# Patient Record
Sex: FEMALE | Race: WHITE | NOT HISPANIC OR LATINO | Employment: PART TIME | URBAN - METROPOLITAN AREA
[De-identification: names, ages, dates, MRNs, and addresses within clinical notes are randomized per-mention and may not be internally consistent; named-entity substitution may affect disease eponyms.]

---

## 2017-03-08 ENCOUNTER — HOSPITAL ENCOUNTER (EMERGENCY)
Facility: HOSPITAL | Age: 32
Discharge: HOME | End: 2017-03-08
Attending: EMERGENCY MEDICINE
Payer: MEDICARE

## 2017-03-08 VITALS
OXYGEN SATURATION: 97 % | HEIGHT: 64 IN | HEART RATE: 79 BPM | BODY MASS INDEX: 21.34 KG/M2 | TEMPERATURE: 98 F | RESPIRATION RATE: 16 BRPM | SYSTOLIC BLOOD PRESSURE: 112 MMHG | DIASTOLIC BLOOD PRESSURE: 69 MMHG | WEIGHT: 125 LBS

## 2017-03-08 DIAGNOSIS — G43.009 NONINTRACTABLE MIGRAINE, UNSPECIFIED MIGRAINE TYPE: Primary | ICD-10-CM

## 2017-03-08 PROCEDURE — 96376 TX/PRO/DX INJ SAME DRUG ADON: CPT

## 2017-03-08 PROCEDURE — 96365 THER/PROPH/DIAG IV INF INIT: CPT

## 2017-03-08 PROCEDURE — 25000003 PHARM REV CODE 250: Performed by: EMERGENCY MEDICINE

## 2017-03-08 PROCEDURE — 96375 TX/PRO/DX INJ NEW DRUG ADDON: CPT

## 2017-03-08 PROCEDURE — 96361 HYDRATE IV INFUSION ADD-ON: CPT

## 2017-03-08 PROCEDURE — 63600175 PHARM REV CODE 636 W HCPCS: Performed by: EMERGENCY MEDICINE

## 2017-03-08 PROCEDURE — 99284 EMERGENCY DEPT VISIT MOD MDM: CPT | Mod: 25

## 2017-03-08 RX ORDER — KETOROLAC TROMETHAMINE 30 MG/ML
10 INJECTION, SOLUTION INTRAMUSCULAR; INTRAVENOUS
Status: COMPLETED | OUTPATIENT
Start: 2017-03-08 | End: 2017-03-08

## 2017-03-08 RX ORDER — DIPHENHYDRAMINE HYDROCHLORIDE 50 MG/ML
25 INJECTION INTRAMUSCULAR; INTRAVENOUS
Status: COMPLETED | OUTPATIENT
Start: 2017-03-08 | End: 2017-03-08

## 2017-03-08 RX ORDER — SUMATRIPTAN 20 MG/1
1 SPRAY NASAL
COMMUNITY
End: 2018-02-09

## 2017-03-08 RX ADMIN — PROMETHAZINE HYDROCHLORIDE 12.5 MG: 25 INJECTION, SOLUTION INTRAMUSCULAR; INTRAVENOUS at 03:03

## 2017-03-08 RX ADMIN — SODIUM CHLORIDE 1000 ML: 0.9 INJECTION, SOLUTION INTRAVENOUS at 03:03

## 2017-03-08 RX ADMIN — KETOROLAC TROMETHAMINE 10 MG: 30 INJECTION, SOLUTION INTRAMUSCULAR at 04:03

## 2017-03-08 RX ADMIN — KETOROLAC TROMETHAMINE 10 MG: 30 INJECTION, SOLUTION INTRAMUSCULAR at 03:03

## 2017-03-08 RX ADMIN — DIPHENHYDRAMINE HYDROCHLORIDE 25 MG: 50 INJECTION, SOLUTION INTRAMUSCULAR; INTRAVENOUS at 03:03

## 2017-03-08 NOTE — DISCHARGE INSTRUCTIONS
What Are Migraine and Tension Headaches?  Although there are several types of headaches, migraine and tension headaches affect the most people. When you have a headache, it isn't your brain that's hurting. Your head aches because nerves in the bones, blood vessels, meninges, and muscles of your head are irritated. These irritated nerves send pain signals to the brain, which identifies where you hurt and how bad the pain is.  Talk with your healthcare provider about a treatment plan that may help relieve pain and prevent future headaches.     What causes your headache?  The actual headache process is not yet understood. Only rarely are headaches a sign of a serious medical problem. Headache pain may be caused by abnormal interaction between the brain and the nerves and blood vessels in the head. Environmental stresses or certain foods and drinks may trigger headache pain.  What is referred pain?  Headache pain can be referred pain, which is pain that has its source in one place but is felt in another. For example, pain behind the eyes may actually be caused by tense muscles in the neck and shoulders. This means that the place that hurts may not be the part of the body that needs treatment.  Is it a migraine?  Migraine is a vascular headache that causes throbbing pain felt on one or both sides of the head. You may feel nauseated or vomit. This headache may also be preceded or associated with changes in sight (like seeing spots or flashes of light), ability to speak, or sensation (aura). There are a wide variety of environmental and food-related triggers for migraines. The pain may last for 4 to 72 hours. Afterward, you may feel shaky for a day or so. If this is the first time you experience these symptoms, you should immediately seek medical attention because you could be having a stroke.  Is it a tension headache?  This type of headache is usually a dull ache or a sensation of pressure on both sides of the head. It  "may be associated with pain or tension in the neck and shoulders. Depression, anxiety, and stress can cause a tension headache. The pain may not have a definite beginning or end. It may come and go, or seem never to go away.  When to call the healthcare provider  Call your healthcare provider for headaches that happen along with any of these symptoms:  · Sudden, severe headache that is different from your usual headache pain  · High fever along with a stiff neck  · Recurring headache in children   · Ongoing numbness or muscle weakness  · Loss of vision  · Pain following a head injury  · Convulsions, or a change in mental awareness  · A headache you would call "the worst headache you've ever had"   Date Last Reviewed: 9/14/2015 © 2000-2016 [employer]. [address] [place] [address], [place] [address]. All rights reserved. This information is not intended as a substitute for professional medical care. Always follow your healthcare professional's instructions.          Migraines and Cluster Headaches  Migraines and cluster headaches cause intense, throbbing pain on one side of the head. With a migraine, you may have nausea and vomiting and be sensitive to light and sound. You may also have warning signs, such as flashing lights or loss of parts of your vision, before the pain starts. Migraines are three times more common in women than men. This may be due to hormonal changes during menstruation. Typical migrains may last for 4 to 72 hours untreated.  Cluster headaches recur in groups for days, weeks, or months. The pain is centered around or behind one eye. The eye may also become red or teary, or the eyelid may droop. Migraines and cluster headaches can have many triggers.    Preventing migraines and cluster headaches  Try the following steps:  · Avoid aged cheeses, nuts, beans, chocolate, red wine, or foods that contain caffeine, alcohol, tobacco, nitrates, and MSG.  · Try not to skip meals.  · Dont work in " poor lighting.  · Reduce stress as much as you can.  · Get plenty of sleep each night.  · Exercise regularly under your doctors guidance.  · Avoid taking headache medicines for more than 3 days, because of the risk of rebound headaches.  Relieving the pain  Try these suggestions:  · Stay quiet and rest.  · Use cold to numb the pain. Wrap ice or a cold can of soda in a cloth. Hold it against the site of pain for 10 minutes. Repeat every 20 minutes.  · Avoid light. Wear dark glasses, turn out lights, and close the curtains. When outdoors, wear a brimmed hat.  · Drink lots of fluids. Sip caffeine-free flat soda to help relieve nausea.  · See your doctor if you get migraines or cluster headaches often. There are effective medications to help treat or prevent them.  · Hormone therapy. This may help women whose migraines are related to hormonal changes during menstruation.  Date Last Reviewed: 10/19/2015  © 7499-1823 [employer]. [address] [place] [address], [place] [address]. All rights reserved. This information is not intended as a substitute for professional medical care. Always follow your healthcare professional's instructions.

## 2017-03-08 NOTE — ED AVS SNAPSHOT
[facility] [address]  [facility] [address]               Mily Shore   3/8/2017  2:50 AM   ED    Description:  Female : 1985   Department:  [facility]           Your Care was Coordinated By:     Provider Role From To    Rob Burk MD Attending Provider 17 0251 --      Reason for Visit     Migraine           Diagnoses this Visit        Comments    Nonintractable migraine, unspecified migraine type    -  Primary       ED Disposition     None           To Do List           Follow-up Information     Follow up with Reinier Mata MD. Schedule an appointment as soon as possible for a visit in 1 week.    Specialty:  Internal Medicine    Why:  return to the ED, As needed, If symptoms worsen    Contact information:    [address] [place] [address] Dr Khalil Bryce  [place] [address]  [phone]        [facility]     [facility] -  Assistance  Registered nurses in the [facility] provide clinical advisement, health education, appointment booking, and other advisory services.  Call for this free service at [phone].             Medications           Message regarding Medications     Verify the changes and/or additions to your medication regime listed below are the same as discussed with your clinician today.  If any of these changes or additions are incorrect, please notify your healthcare provider.        These medications were administered today        Dose Freq    sodium chloride 0.9% bolus 1,000 mL 1,000 mL ED 1 Time    Sig: Inject 1,000 mLs into the vein ED 1 Time.    Class: Normal    Route: Intravenous    promethazine (PHENERGAN) 12.5 mg in dextrose 5 % 50 mL IVPB 12.5 mg ED 1 Time    Sig: Inject 12.5 mg into the vein ED 1 Time.    Class: Normal    Route: Intravenous    ketorolac injection 10 mg 10 mg ED 1 Time    Sig: Inject 10 mg into the vein ED 1 Time.    Class: Normal    Route: Intravenous  "   diphenhydrAMINE injection 25 mg 25 mg ED 1 Time    Sig: Inject 0.5 mLs (25 mg total) into the vein ED 1 Time.    Class: Normal    Route: Intravenous    ketorolac injection 10 mg 10 mg ED 1 Time    Sig: Inject 10 mg into the vein ED 1 Time.    Class: Normal    Route: Intravenous      STOP taking these medications     promethazine (PHENERGAN) 25 MG tablet Take 1 tablet (25 mg total) by mouth every 6 (six) hours as needed for Nausea.    ondansetron (ZOFRAN-ODT) 4 MG TbDL Take 1 tablet (4 mg total) by mouth every 8 (eight) hours as needed.    sumatriptan (IMITREX) 50 MG tablet Take 50 mg by mouth every 2 (two) hours as needed for Migraine.           Verify that the below list of medications is an accurate representation of the medications you are currently taking.  If none reported, the list may be blank. If incorrect, please contact your healthcare provider. Carry this list with you in case of emergency.           Current Medications     clonazepam (KLONOPIN) 2 MG Tab Take 0.5 mg by mouth every evening.     hydrocodone-acetaminophen 10-325mg (NORCO)  mg Tab Take by mouth.    sumatriptan (IMITREX) 20 mg/actuation nasal spray 1 spray by Nasal route every 2 (two) hours as needed for Migraine.    ketorolac (TORADOL) 10 mg tablet Take 15 mg by mouth every 6 (six) hours.           Clinical Reference Information           Your Vitals Were     Pulse Temp Resp Height Weight SpO2    97 97.8 °F (36.6 °C) (Oral) 16 5' 4" (1.626 m) 56.7 kg (125 lb) 100%    BMI                21.46 kg/m2          Allergies as of 3/8/2017        Reactions    Compazine [Prochlorperazine Edisylate] Other (See Comments)    "I pretty much go crazy"    Compazine [Prochlorperazine]     Reglan [Metoclopramide]     "I go crazy"    Thorazine [Chlorpromazine]     Makes her "antsy' "edgy", and itch    Penicillins Rash      Immunizations Administered on Date of Encounter - 3/8/2017     None      ED Micro, Lab, POCT     Start Ordered       Status " Ordering Provider    03/08/17 0258 03/08/17 0258    Once,   Status:  Canceled      Canceled       ED Imaging Orders     None        Discharge Instructions         What Are Migraine and Tension Headaches?  Although there are several types of headaches, migraine and tension headaches affect the most people. When you have a headache, it isn't your brain that's hurting. Your head aches because nerves in the bones, blood vessels, meninges, and muscles of your head are irritated. These irritated nerves send pain signals to the brain, which identifies where you hurt and how bad the pain is.  Talk with your healthcare provider about a treatment plan that may help relieve pain and prevent future headaches.     What causes your headache?  The actual headache process is not yet understood. Only rarely are headaches a sign of a serious medical problem. Headache pain may be caused by abnormal interaction between the brain and the nerves and blood vessels in the head. Environmental stresses or certain foods and drinks may trigger headache pain.  What is referred pain?  Headache pain can be referred pain, which is pain that has its source in one place but is felt in another. For example, pain behind the eyes may actually be caused by tense muscles in the neck and shoulders. This means that the place that hurts may not be the part of the body that needs treatment.  Is it a migraine?  Migraine is a vascular headache that causes throbbing pain felt on one or both sides of the head. You may feel nauseated or vomit. This headache may also be preceded or associated with changes in sight (like seeing spots or flashes of light), ability to speak, or sensation (aura). There are a wide variety of environmental and food-related triggers for migraines. The pain may last for 4 to 72 hours. Afterward, you may feel shaky for a day or so. If this is the first time you experience these symptoms, you should immediately seek medical attention  "because you could be having a stroke.  Is it a tension headache?  This type of headache is usually a dull ache or a sensation of pressure on both sides of the head. It may be associated with pain or tension in the neck and shoulders. Depression, anxiety, and stress can cause a tension headache. The pain may not have a definite beginning or end. It may come and go, or seem never to go away.  When to call the healthcare provider  Call your healthcare provider for headaches that happen along with any of these symptoms:  · Sudden, severe headache that is different from your usual headache pain  · High fever along with a stiff neck  · Recurring headache in children   · Ongoing numbness or muscle weakness  · Loss of vision  · Pain following a head injury  · Convulsions, or a change in mental awareness  · A headache you would call "the worst headache you've ever had"   Date Last Reviewed: 9/14/2015  © 7285-2135 [employer]. [address] [place] [address]. All rights reserved. This information is not intended as a substitute for professional medical care. Always follow your healthcare professional's instructions.          Migraines and Cluster Headaches  Migraines and cluster headaches cause intense, throbbing pain on one side of the head. With a migraine, you may have nausea and vomiting and be sensitive to light and sound. You may also have warning signs, such as flashing lights or loss of parts of your vision, before the pain starts. Migraines are three times more common in women than men. This may be due to hormonal changes during menstruation. Typical migrains may last for 4 to 72 hours untreated.  Cluster headaches recur in groups for days, weeks, or months. The pain is centered around or behind one eye. The eye may also become red or teary, or the eyelid may droop. Migraines and cluster headaches can have many triggers.    Preventing migraines and cluster headaches  Try the following " steps:  · Avoid aged cheeses, nuts, beans, chocolate, red wine, or foods that contain caffeine, alcohol, tobacco, nitrates, and MSG.  · Try not to skip meals.  · Dont work in poor lighting.  · Reduce stress as much as you can.  · Get plenty of sleep each night.  · Exercise regularly under your doctors guidance.  · Avoid taking headache medicines for more than 3 days, because of the risk of rebound headaches.  Relieving the pain  Try these suggestions:  · Stay quiet and rest.  · Use cold to numb the pain. Wrap ice or a cold can of soda in a cloth. Hold it against the site of pain for 10 minutes. Repeat every 20 minutes.  · Avoid light. Wear dark glasses, turn out lights, and close the curtains. When outdoors, wear a brimmed hat.  · Drink lots of fluids. Sip caffeine-free flat soda to help relieve nausea.  · See your doctor if you get migraines or cluster headaches often. There are effective medications to help treat or prevent them.  · Hormone therapy. This may help women whose migraines are related to hormonal changes during menstruation.  Date Last Reviewed: 10/19/2015  © 0431-4806 [employer]. [address] [place] [address], [place] [address]. All rights reserved. This information is not intended as a substitute for professional medical care. Always follow your healthcare professional's instructions.           [facility] complies with applicable Federal civil rights laws and does not discriminate on the basis of race, color, national origin, age, disability, or sex.        Language Assistance Services     ATTENTION: Language assistance services are available, free of charge. Please call [phone].      ATENCIÓN: Si habla shaggy, tiene a peralta disposición servicios gratuitos de asistencia lingüística. Llame al [phone].     CHÚ Ý: N?u b?n nói Ti?ng Vi?t, có các d?ch v? h? tr? ngôn ng? mi?n phí dành cho b?n. G?i s? [phone].

## 2017-03-08 NOTE — ED PROVIDER NOTES
"Encounter Date: 3/8/2017       History     Chief Complaint   Patient presents with    Migraine     Review of patient's allergies indicates:   Allergen Reactions    Compazine [prochlorperazine edisylate] Other (See Comments)     "I pretty much go crazy"    Compazine [prochlorperazine]     Reglan [metoclopramide]      "I go crazy"    Thorazine [chlorpromazine]      Makes her "antsy' "edgy", and itch    Penicillins Rash     HPI Comments: 31-year-old female with a past medical history of migraine headaches presents with a chief complaint of headache.  She reports that he feels like one of her typical migraines and started acutely 4 days ago.  She reports that is unrelieved with her home Imitrex.  She reports that it is associated with multiple episodes of nausea and vomiting, and photophobia.  She denies any associated fever/chills, paresthesias, changes in her voice, chest pain, or cough.  She reports her headaches are also usually controlled by Botox given by her PCP.    The history is provided by the patient.     Past Medical History:   Diagnosis Date    Abnormal Pap smear     Anxiety     Complication of anesthesia     Depression     History of psychiatric hospitalization     Insomnia     Migraine     Migraine headache     Seizure 9/10/2013    Seizures      Past Surgical History:   Procedure Laterality Date    DEEP BRAIN STIMULATOR PLACEMENT      HYSTERECTOMY      implant      to treat sz    TUBAL LIGATION       Family History   Problem Relation Age of Onset    Migraines Father     Diabetes Maternal Aunt      Social History   Substance Use Topics    Smoking status: Current Every Day Smoker     Packs/day: 0.50     Types: Cigarettes    Smokeless tobacco: None    Alcohol use Yes      Comment: occasionally     Review of Systems   Constitutional: Negative for chills and fever.   Respiratory: Negative for shortness of breath.    Cardiovascular: Negative for chest pain.   Gastrointestinal: Negative " for abdominal pain, nausea and vomiting.   Genitourinary: Negative for dysuria.   Musculoskeletal: Negative for back pain.   Skin: Negative for color change.   Neurological: Positive for headaches.   Psychiatric/Behavioral: Negative for confusion.       Physical Exam   Initial Vitals   BP Pulse Resp Temp SpO2   -- 03/08/17 0248 03/08/17 0248 03/08/17 0248 03/08/17 0248    97 16 97.8 °F (36.6 °C) 100 %     Physical Exam    Nursing note and vitals reviewed.  Constitutional: She appears well-developed and well-nourished.   HENT:   Head: Atraumatic.   Eyes: EOM are normal. Pupils are equal, round, and reactive to light.   Neck: Normal range of motion.   Cardiovascular: Normal rate and regular rhythm.   Pulmonary/Chest: Breath sounds normal.   Abdominal: Soft. Bowel sounds are normal.   Musculoskeletal: Normal range of motion.        Right shoulder: Normal.        Left shoulder: Normal.   Neurological: She is alert and oriented to person, place, and time.   Skin: Skin is warm and dry.   Psychiatric: She has a normal mood and affect.         ED Course   Procedures  Labs Reviewed   POCT URINE PREGNANCY             Medical Decision Making:   Initial Assessment:   31-year-old female presented with a chief complaint of a headache.  Differential Diagnosis:   Initial differential diagnosis included but not limited to migraine headache, tension headache, and cluster headache.  ED Management:  The patient was urgently evaluated in the ED, her evaluation was significant for a young female with a normal neurologic exam.  The patient was aggressively treated with IV Toradol, IV Phenergan, IV Benadryl, and IV fluids in the ED.  The patient's headache completely resolved after treatment in the ED.  The etiology of her headache is likely a nonintractable migraine.  She is stable for discharge home.  She is to continue her home medications as previously prescribed and she is to follow-up with her PCP for further care.                    ED Course     Clinical Impression:   The encounter diagnosis was Nonintractable migraine, unspecified migraine type.          Rob Burk MD  03/08/17 0439

## 2017-03-27 ENCOUNTER — HOSPITAL ENCOUNTER (EMERGENCY)
Facility: HOSPITAL | Age: 32
Discharge: HOME | End: 2017-03-27
Attending: EMERGENCY MEDICINE
Payer: MEDICARE

## 2017-03-27 VITALS
HEIGHT: 64 IN | HEART RATE: 85 BPM | SYSTOLIC BLOOD PRESSURE: 138 MMHG | OXYGEN SATURATION: 100 % | RESPIRATION RATE: 12 BRPM | DIASTOLIC BLOOD PRESSURE: 87 MMHG | BODY MASS INDEX: 21.34 KG/M2 | TEMPERATURE: 98 F | WEIGHT: 125 LBS

## 2017-03-27 DIAGNOSIS — S16.1XXA CERVICAL STRAIN, ACUTE, INITIAL ENCOUNTER: ICD-10-CM

## 2017-03-27 DIAGNOSIS — R51.9 NONINTRACTABLE HEADACHE, UNSPECIFIED CHRONICITY PATTERN, UNSPECIFIED HEADACHE TYPE: ICD-10-CM
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
## 2017-03-27 DIAGNOSIS — M62.838 CERVICAL PARASPINOUS MUSCLE SPASM: ICD-10-CM

## 2017-03-27 PROCEDURE — 96365 THER/PROPH/DIAG IV INF INIT: CPT

## 2017-03-27 PROCEDURE — 96372 THER/PROPH/DIAG INJ SC/IM: CPT

## 2017-03-27 PROCEDURE — 63600175 PHARM REV CODE 636 W HCPCS: Performed by: EMERGENCY MEDICINE

## 2017-03-27 PROCEDURE — 96375 TX/PRO/DX INJ NEW DRUG ADDON: CPT

## 2017-03-27 PROCEDURE — 63600175 PHARM REV CODE 636 W HCPCS: Performed by: PHYSICIAN ASSISTANT

## 2017-03-27 PROCEDURE — 96361 HYDRATE IV INFUSION ADD-ON: CPT

## 2017-03-27 PROCEDURE — 25000003 PHARM REV CODE 250: Performed by: PHYSICIAN ASSISTANT

## 2017-03-27 PROCEDURE — 96376 TX/PRO/DX INJ SAME DRUG ADON: CPT

## 2017-03-27 PROCEDURE — 99284 EMERGENCY DEPT VISIT MOD MDM: CPT | Mod: 25

## 2017-03-27 RX ORDER — CYCLOBENZAPRINE HCL 10 MG
10 TABLET ORAL 3 TIMES DAILY PRN
Qty: 12 TABLET | Refills: 0 | Status: SHIPPED | OUTPATIENT
Start: 2017-03-27 | End: 2017-03-31

## 2017-03-27 RX ORDER — LIDOCAINE 50 MG/G
1 PATCH TOPICAL DAILY
Qty: 30 PATCH | Refills: 0 | Status: SHIPPED | OUTPATIENT
Start: 2017-03-27 | End: 2017-10-11

## 2017-03-27 RX ORDER — KETOROLAC TROMETHAMINE 30 MG/ML
15 INJECTION, SOLUTION INTRAMUSCULAR; INTRAVENOUS
Status: COMPLETED | OUTPATIENT
Start: 2017-03-27 | End: 2017-03-27

## 2017-03-27 RX ORDER — PROMETHAZINE HYDROCHLORIDE 25 MG/1
25 TABLET ORAL EVERY 6 HOURS PRN
Qty: 20 TABLET | Refills: 0 | Status: SHIPPED | OUTPATIENT
Start: 2017-03-27 | End: 2017-08-29

## 2017-03-27 RX ORDER — DIPHENHYDRAMINE HYDROCHLORIDE 50 MG/ML
25 INJECTION INTRAMUSCULAR; INTRAVENOUS
Status: COMPLETED | OUTPATIENT
Start: 2017-03-27 | End: 2017-03-27

## 2017-03-27 RX ORDER — ORPHENADRINE CITRATE 30 MG/ML
30 INJECTION INTRAMUSCULAR; INTRAVENOUS
Status: COMPLETED | OUTPATIENT
Start: 2017-03-27 | End: 2017-03-27

## 2017-03-27 RX ORDER — ONDANSETRON 2 MG/ML
8 INJECTION INTRAMUSCULAR; INTRAVENOUS
Status: COMPLETED | OUTPATIENT
Start: 2017-03-27 | End: 2017-03-27

## 2017-03-27 RX ADMIN — KETOROLAC TROMETHAMINE 15 MG: 30 INJECTION, SOLUTION INTRAMUSCULAR at 02:03

## 2017-03-27 RX ADMIN — SODIUM CHLORIDE 1000 ML: 0.9 INJECTION, SOLUTION INTRAVENOUS at 02:03

## 2017-03-27 RX ADMIN — KETOROLAC TROMETHAMINE 15 MG: 30 INJECTION, SOLUTION INTRAMUSCULAR at 03:03

## 2017-03-27 RX ADMIN — ONDANSETRON 8 MG: 2 INJECTION INTRAMUSCULAR; INTRAVENOUS at 03:03

## 2017-03-27 RX ADMIN — PROMETHAZINE HYDROCHLORIDE 25 MG: 25 INJECTION INTRAMUSCULAR; INTRAVENOUS at 02:03

## 2017-03-27 RX ADMIN — DIPHENHYDRAMINE HYDROCHLORIDE 25 MG: 50 INJECTION, SOLUTION INTRAMUSCULAR; INTRAVENOUS at 02:03

## 2017-03-27 RX ADMIN — ORPHENADRINE CITRATE 30 MG: 30 INJECTION INTRAMUSCULAR; INTRAVENOUS at 02:03
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
## 2017-03-27 NOTE — ED NOTES
"Patient identifiers for Mily Shore checked and correct.  LOC: Patient is awake, alert, and aware of environment with an appropriate affect. Patient is oriented x 3 and speaking appropriately.  APPEARANCE: Patient resting comfortably and in no acute distress. Patient is clean and well groomed, patient's clothing is properly fastened.  SKIN: The skin is warm and dry. Patient has normal skin turgor and moist mucus membrances. Skin is intact; no bruising or breakdown noted.  MUSKULOSKELETAL: Patient is moving all extremities well, no obvious deformities noted. Pulses intact.   RESPIRATORY: Airway is open and patent. Respirations are spontaneous and non-labored with normal effort and rate.  CARDIAC: Patient has a normal rate and rhythm. No peripheral edema noted. Capillary refill < 3 seconds.  ABDOMEN: No distention noted. Bowel sounds active in all 4 quadrants. Soft and non-tender upon palpation.  NEUROLOGICAL: PERRL. Facial expression is symmetrical. Hand grasps are equal bilaterally. Normal sensation in all extremities when touched with finger.  Allergies reported:   Review of patient's allergies indicates:   Allergen Reactions    Compazine [prochlorperazine edisylate] Other (See Comments)     "I pretty much go crazy"    Compazine [prochlorperazine]     Reglan [metoclopramide]      "I go crazy"    Thorazine [chlorpromazine]      Makes her "antsy' "edgy", and itch    Penicillins Rash     "
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
## 2017-06-18 ENCOUNTER — HOSPITAL ENCOUNTER (EMERGENCY)
Facility: HOSPITAL | Age: 32
Discharge: HOME | End: 2017-06-18
Attending: EMERGENCY MEDICINE
Payer: MEDICARE

## 2017-06-18 VITALS
HEART RATE: 69 BPM | WEIGHT: 130 LBS | DIASTOLIC BLOOD PRESSURE: 80 MMHG | BODY MASS INDEX: 22.2 KG/M2 | SYSTOLIC BLOOD PRESSURE: 121 MMHG | TEMPERATURE: 98 F | HEIGHT: 64 IN | RESPIRATION RATE: 18 BRPM | OXYGEN SATURATION: 98 %

## 2017-06-18 DIAGNOSIS — G43.909 MIGRAINE WITHOUT STATUS MIGRAINOSUS, NOT INTRACTABLE, UNSPECIFIED MIGRAINE TYPE: Primary | ICD-10-CM

## 2017-06-18 PROCEDURE — 96375 TX/PRO/DX INJ NEW DRUG ADDON: CPT

## 2017-06-18 PROCEDURE — 25000003 PHARM REV CODE 250: Performed by: EMERGENCY MEDICINE

## 2017-06-18 PROCEDURE — 63600175 PHARM REV CODE 636 W HCPCS: Performed by: EMERGENCY MEDICINE

## 2017-06-18 PROCEDURE — 96365 THER/PROPH/DIAG IV INF INIT: CPT

## 2017-06-18 PROCEDURE — 99284 EMERGENCY DEPT VISIT MOD MDM: CPT | Mod: 25

## 2017-06-18 RX ORDER — DIPHENHYDRAMINE HYDROCHLORIDE 50 MG/ML
25 INJECTION INTRAMUSCULAR; INTRAVENOUS
Status: COMPLETED | OUTPATIENT
Start: 2017-06-18 | End: 2017-06-18

## 2017-06-18 RX ORDER — KETOROLAC TROMETHAMINE 30 MG/ML
30 INJECTION, SOLUTION INTRAMUSCULAR; INTRAVENOUS
Status: COMPLETED | OUTPATIENT
Start: 2017-06-18 | End: 2017-06-18

## 2017-06-18 RX ADMIN — KETOROLAC TROMETHAMINE 30 MG: 30 INJECTION, SOLUTION INTRAMUSCULAR at 10:06

## 2017-06-18 RX ADMIN — DIPHENHYDRAMINE HYDROCHLORIDE 25 MG: 50 INJECTION, SOLUTION INTRAMUSCULAR; INTRAVENOUS at 10:06

## 2017-06-18 RX ADMIN — PROMETHAZINE HYDROCHLORIDE 25 MG: 25 INJECTION INTRAMUSCULAR; INTRAVENOUS at 10:06

## 2017-06-18 RX ADMIN — SODIUM CHLORIDE 1000 ML: 0.9 INJECTION, SOLUTION INTRAVENOUS at 10:06

## 2017-06-19 NOTE — ED NOTES
Patient identifiers for Mily Shore checked and correct.  LOC: Eyes closed. Awakes with verbal stimuli, alert and aware of environment with an appropriate affect, the patient is oriented x 3 and speaking appropriately.  APPEARANCE: Patient uncomfortable.  SKIN: The skin is warm and dry, patient has normal skin turgor and moist mucus membranes.  MUSKULOSKELETAL: Patient moving all extremities well, no obvious swelling or deformities noted.  RESPIRATORY: Airway is open and patent, respirations are spontaneous, patient has a normal effort and rate.

## 2017-06-19 NOTE — ED PROVIDER NOTES
"Encounter Date: 6/18/2017       History     Chief Complaint   Patient presents with    Migraine     Patient presents to the ED with reports of having a migraine headache felt in both temporal areas of her head. Symptoms include nausea and multiple episodes of vomiting. Denies any fever.     Emesis     Review of patient's allergies indicates:   Allergen Reactions    Compazine [prochlorperazine edisylate] Other (See Comments)     "I pretty much go crazy"    Compazine [prochlorperazine]     Reglan [metoclopramide]      "I go crazy"    Thorazine [chlorpromazine]      Makes her "antsy' "edgy", and itch    Penicillins Rash     The history is provided by the patient.   Headache    This is a recurrent problem. The current episode started today. The problem occurs constantly. The problem has been gradually worsening. The pain is located in the bilateral region. The pain does not radiate. The pain quality is similar to prior headaches. The quality of the pain is described as excruciating. The pain is at a severity of 10/10. Associated symptoms include nausea and photophobia. Pertinent negatives include no abdominal pain, fever, neck pain or vomiting. The symptoms are aggravated by bright light. Her past medical history is significant for migraine headaches.     Past Medical History:   Diagnosis Date    Abnormal Pap smear     Anxiety     Complication of anesthesia     Depression     History of psychiatric hospitalization     Insomnia     Migraine     Migraine headache     Seizure 9/10/2013    Seizures      Past Surgical History:   Procedure Laterality Date    DEEP BRAIN STIMULATOR PLACEMENT      HYSTERECTOMY      implant      to treat sz    TUBAL LIGATION       Family History   Problem Relation Age of Onset    Migraines Father     Diabetes Maternal Aunt      Social History   Substance Use Topics    Smoking status: Current Every Day Smoker     Packs/day: 0.50     Types: Cigarettes    Smokeless tobacco: " Not on file    Alcohol use Yes      Comment: occasionally     Review of Systems   Constitutional: Negative for chills and fever.   Eyes: Positive for photophobia. Negative for visual disturbance.   Gastrointestinal: Positive for nausea. Negative for abdominal pain and vomiting.   Musculoskeletal: Negative for neck pain and neck stiffness.   Neurological: Positive for headaches. Negative for syncope.       Physical Exam     Initial Vitals [06/18/17 2153]   BP Pulse Resp Temp SpO2   134/84 85 20 97.5 °F (36.4 °C) 98 %     Physical Exam    Nursing note and vitals reviewed.  Constitutional: She appears distressed.   HENT:   Head: Normocephalic and atraumatic.   Eyes: EOM are normal. Pupils are equal, round, and reactive to light.   Neck: Normal range of motion. Neck supple.   No meningeal signs.   Cardiovascular: Normal rate, regular rhythm and normal heart sounds.   Pulmonary/Chest: Breath sounds normal.   Musculoskeletal: Normal range of motion. She exhibits no edema.   Neurological: She is alert and oriented to person, place, and time.   Skin: Skin is warm and dry.   Psychiatric: Her behavior is normal. Thought content normal.         ED Course   Procedures  Labs Reviewed - No data to display          Medical Decision Making:   ED Management:  31-year-old female with her usual type of migraine headache.  Pain was relieved here in the ED with the administration of intravenous Toradol, Phenergan and Benadryl.  She was also given 1 L of IV fluid.  She was discharged in good condition to continue her current medications and follow-up with her primary physician as needed.                   ED Course     Clinical Impression:   The encounter diagnosis was Migraine without status migrainosus, not intractable, unspecified migraine type.          Conor De La Rosa MD  06/19/17 0028

## 2017-08-24 ENCOUNTER — OFFICE VISIT (OUTPATIENT)
Dept: URGENT CARE | Facility: CLINIC | Age: 32
End: 2017-08-24
Payer: MEDICARE

## 2017-08-24 VITALS
HEART RATE: 78 BPM | SYSTOLIC BLOOD PRESSURE: 128 MMHG | TEMPERATURE: 98 F | WEIGHT: 130 LBS | OXYGEN SATURATION: 100 % | HEIGHT: 64 IN | BODY MASS INDEX: 22.2 KG/M2 | RESPIRATION RATE: 16 BRPM | DIASTOLIC BLOOD PRESSURE: 90 MMHG

## 2017-08-24 DIAGNOSIS — H81.313 VERTIGO, AURAL, BILATERAL: Primary | ICD-10-CM

## 2017-08-24 DIAGNOSIS — H66.91 ACUTE OTITIS MEDIA, RIGHT: ICD-10-CM

## 2017-08-24 DIAGNOSIS — H10.31 ACUTE CONJUNCTIVITIS OF RIGHT EYE, UNSPECIFIED ACUTE CONJUNCTIVITIS TYPE: ICD-10-CM

## 2017-08-24 DIAGNOSIS — R11.0 NAUSEA: ICD-10-CM

## 2017-08-24 PROCEDURE — 96372 THER/PROPH/DIAG INJ SC/IM: CPT | Mod: S$GLB,,, | Performed by: INTERNAL MEDICINE

## 2017-08-24 PROCEDURE — 99214 OFFICE O/P EST MOD 30 MIN: CPT | Mod: 25,S$GLB,, | Performed by: INTERNAL MEDICINE

## 2017-08-24 RX ORDER — PROMETHAZINE HYDROCHLORIDE 25 MG/1
25 TABLET ORAL EVERY 4 HOURS
Qty: 20 TABLET | Refills: 0 | Status: SHIPPED | OUTPATIENT
Start: 2017-08-24 | End: 2017-10-11

## 2017-08-24 RX ORDER — AMOXICILLIN AND CLAVULANATE POTASSIUM 875; 125 MG/1; MG/1
1 TABLET, FILM COATED ORAL EVERY 12 HOURS
Qty: 20 TABLET | Refills: 0 | Status: SHIPPED | OUTPATIENT
Start: 2017-08-24 | End: 2017-08-29 | Stop reason: ALTCHOICE

## 2017-08-24 RX ORDER — ONDANSETRON 2 MG/ML
4 INJECTION INTRAMUSCULAR; INTRAVENOUS
Status: COMPLETED | OUTPATIENT
Start: 2017-08-24 | End: 2017-08-24

## 2017-08-24 RX ORDER — BETAMETHASONE SODIUM PHOSPHATE AND BETAMETHASONE ACETATE 3; 3 MG/ML; MG/ML
9 INJECTION, SUSPENSION INTRA-ARTICULAR; INTRALESIONAL; INTRAMUSCULAR; SOFT TISSUE ONCE
Status: COMPLETED | OUTPATIENT
Start: 2017-08-24 | End: 2017-08-24

## 2017-08-24 RX ORDER — MECLIZINE HYDROCHLORIDE 25 MG/1
25 TABLET ORAL 3 TIMES DAILY PRN
Qty: 30 TABLET | Refills: 0 | Status: SHIPPED | OUTPATIENT
Start: 2017-08-24 | End: 2017-10-11

## 2017-08-24 RX ORDER — POLYMYXIN B SULFATE AND TRIMETHOPRIM 1; 10000 MG/ML; [USP'U]/ML
1 SOLUTION OPHTHALMIC EVERY 6 HOURS
Qty: 1 BOTTLE | Refills: 0 | Status: SHIPPED | OUTPATIENT
Start: 2017-08-24 | End: 2017-10-11

## 2017-08-24 RX ADMIN — ONDANSETRON 4 MG: 2 INJECTION INTRAMUSCULAR; INTRAVENOUS at 11:08

## 2017-08-24 RX ADMIN — BETAMETHASONE SODIUM PHOSPHATE AND BETAMETHASONE ACETATE 9 MG: 3; 3 INJECTION, SUSPENSION INTRA-ARTICULAR; INTRALESIONAL; INTRAMUSCULAR; SOFT TISSUE at 11:08

## 2017-08-24 NOTE — PROGRESS NOTES
"Subjective:       Patient ID: Mily Shore is a 31 y.o. female.    Vitals:  height is 5' 4" (1.626 m) and weight is 59 kg (130 lb). Her oral temperature is 98 °F (36.7 °C). Her blood pressure is 128/90 (abnormal) and her pulse is 78. Her respiration is 16 and oxygen saturation is 100%.     Chief Complaint: Otalgia    Pt states she was being treated for an ear infection since last week that has not gotten better. Pt did not take all of her antibiotics, now she has dizziness, vomiting and and headache. The dizziness started yesterday. Pt also has a migraine.       Otalgia    This is a new problem. The current episode started in the past 7 days. The problem occurs constantly. The problem has been gradually worsening. There has been no fever. The pain is at a severity of 6/10. The pain is moderate. Associated symptoms include vomiting. Pertinent negatives include no abdominal pain, diarrhea, headaches, rash or sore throat. She has tried antibiotics for the symptoms.     Review of Systems   Constitution: Negative for chills and fever.   HENT: Positive for ear pain. Negative for headaches and sore throat.    Eyes: Negative for blurred vision.   Cardiovascular: Negative for chest pain.   Respiratory: Negative for shortness of breath.    Skin: Negative for rash.   Musculoskeletal: Negative for back pain and joint pain.   Gastrointestinal: Positive for vomiting. Negative for abdominal pain, diarrhea and nausea.   Neurological: Positive for dizziness and loss of balance.   Psychiatric/Behavioral: The patient is not nervous/anxious.        Objective:      Physical Exam   Constitutional: She appears well-developed and well-nourished.   HENT:   Head: Normocephalic and atraumatic.   Right Ear: Tympanic membrane is injected and erythematous.   Left Ear: Tympanic membrane is injected and erythematous.   bilataeral cloudy fluid behind TMs   Eyes: EOM are normal. Pupils are equal, round, and reactive to light.   Conjunctiva " injected on R       Assessment:       1. Vertigo, aural, bilateral    2. Acute otitis media, right    3. Acute conjunctivitis of right eye, unspecified acute conjunctivitis type    4. Nausea       Plan:

## 2017-08-29 ENCOUNTER — HOSPITAL ENCOUNTER (EMERGENCY)
Facility: HOSPITAL | Age: 32
Discharge: HOME | End: 2017-08-29
Attending: EMERGENCY MEDICINE | Admitting: EMERGENCY MEDICINE
Payer: MEDICARE

## 2017-08-29 VITALS
HEIGHT: 64 IN | SYSTOLIC BLOOD PRESSURE: 112 MMHG | DIASTOLIC BLOOD PRESSURE: 85 MMHG | WEIGHT: 125 LBS | TEMPERATURE: 99 F | HEART RATE: 103 BPM | RESPIRATION RATE: 18 BRPM | OXYGEN SATURATION: 100 % | BODY MASS INDEX: 21.34 KG/M2

## 2017-08-29 DIAGNOSIS — H65.03 BILATERAL ACUTE SEROUS OTITIS MEDIA, RECURRENCE NOT SPECIFIED: ICD-10-CM

## 2017-08-29 DIAGNOSIS — R51.9 ACUTE NONINTRACTABLE HEADACHE, UNSPECIFIED HEADACHE TYPE: Primary | ICD-10-CM

## 2017-08-29 PROCEDURE — 63600175 PHARM REV CODE 636 W HCPCS: Performed by: PHYSICIAN ASSISTANT

## 2017-08-29 PROCEDURE — 96365 THER/PROPH/DIAG IV INF INIT: CPT

## 2017-08-29 PROCEDURE — 25000003 PHARM REV CODE 250: Performed by: PHYSICIAN ASSISTANT

## 2017-08-29 PROCEDURE — 99284 EMERGENCY DEPT VISIT MOD MDM: CPT | Mod: 25

## 2017-08-29 PROCEDURE — 96375 TX/PRO/DX INJ NEW DRUG ADDON: CPT

## 2017-08-29 RX ORDER — GUAIFENESIN/DEXTROMETHORPHAN 100-10MG/5
5 SYRUP ORAL 4 TIMES DAILY PRN
Qty: 120 ML | Refills: 0 | Status: SHIPPED | OUTPATIENT
Start: 2017-08-29 | End: 2017-09-08

## 2017-08-29 RX ORDER — FLUTICASONE PROPIONATE 50 MCG
1 SPRAY, SUSPENSION (ML) NASAL 2 TIMES DAILY
Qty: 15 G | Refills: 0 | Status: SHIPPED | OUTPATIENT
Start: 2017-08-29 | End: 2017-09-05

## 2017-08-29 RX ORDER — DIPHENHYDRAMINE HYDROCHLORIDE 50 MG/ML
25 INJECTION INTRAMUSCULAR; INTRAVENOUS
Status: COMPLETED | OUTPATIENT
Start: 2017-08-29 | End: 2017-08-29

## 2017-08-29 RX ORDER — PHENYLEPHRINE HCL 10 MG/1
10 TABLET, FILM COATED ORAL EVERY 4 HOURS PRN
COMMUNITY
End: 2017-10-11

## 2017-08-29 RX ORDER — KETOROLAC TROMETHAMINE 30 MG/ML
30 INJECTION, SOLUTION INTRAMUSCULAR; INTRAVENOUS
Status: COMPLETED | OUTPATIENT
Start: 2017-08-29 | End: 2017-08-29

## 2017-08-29 RX ORDER — CEFDINIR 300 MG/1
300 CAPSULE ORAL 2 TIMES DAILY
Qty: 20 CAPSULE | Refills: 0 | Status: SHIPPED | OUTPATIENT
Start: 2017-08-29 | End: 2017-09-08

## 2017-08-29 RX ADMIN — KETOROLAC TROMETHAMINE 30 MG: 30 INJECTION, SOLUTION INTRAMUSCULAR at 11:08

## 2017-08-29 RX ADMIN — SODIUM CHLORIDE 1000 ML: 0.9 INJECTION, SOLUTION INTRAVENOUS at 11:08

## 2017-08-29 RX ADMIN — DIPHENHYDRAMINE HYDROCHLORIDE 25 MG: 50 INJECTION, SOLUTION INTRAMUSCULAR; INTRAVENOUS at 11:08

## 2017-08-29 RX ADMIN — PROMETHAZINE HYDROCHLORIDE 25 MG: 25 INJECTION INTRAMUSCULAR; INTRAVENOUS at 11:08

## 2017-08-29 NOTE — ED NOTES
States that headache and nausea are better but her right ear continues to be very painful asking for warm compress, given to pt call light in reach

## 2017-08-29 NOTE — ED PROVIDER NOTES
"Encounter Date: 8/29/2017       History     Chief Complaint   Patient presents with    Headache     earache, "I feel dizzy and off", no relief with meds.     Patient is a 31 year old female who presents with headache for 5 days. She reports PMH significant for anxiety, seizures and migraine headaches. She states she has chronic migraines for which she takes imitrex. She reports she has been unable to take the oral Imitrex secondary to nausea and vomiting. She has been trying to use the nasal spray with no significant improvement. She reports pain to bilateral temporal areas. She reports associated dizziness and photophobia. She states the symptoms are consistent with her chronic migraines. She denied sudden onset of symptoms and denied fever, neck pain or trauma.       The history is provided by the patient.     Review of patient's allergies indicates:   Allergen Reactions    Compazine [prochlorperazine edisylate] Other (See Comments)     "I pretty much go crazy"    Compazine [prochlorperazine]     Reglan [metoclopramide]      "I go crazy"    Thorazine [chlorpromazine]      Makes her "antsy' "edgy", and itch    Penicillins Rash     Past Medical History:   Diagnosis Date    Abnormal Pap smear     Anxiety     Complication of anesthesia     Depression     History of psychiatric hospitalization     Insomnia     Migraine     Migraine headache     Seizure 9/10/2013    Seizures      Past Surgical History:   Procedure Laterality Date    DEEP BRAIN STIMULATOR PLACEMENT      HYSTERECTOMY      implant      to treat sz    TUBAL LIGATION       Family History   Problem Relation Age of Onset    Migraines Father     Diabetes Maternal Aunt      Social History   Substance Use Topics    Smoking status: Current Every Day Smoker     Packs/day: 0.50     Types: Cigarettes    Smokeless tobacco: Never Used    Alcohol use Yes      Comment: occasionally     Review of Systems   Constitutional: Negative for chills and " fever.   HENT: Negative for congestion and sore throat.    Eyes: Positive for photophobia and visual disturbance.   Respiratory: Negative for cough and shortness of breath.    Cardiovascular: Negative for chest pain.   Gastrointestinal: Positive for nausea and vomiting. Negative for abdominal pain and diarrhea.   Genitourinary: Negative for dysuria.   Musculoskeletal: Negative for back pain.   Skin: Negative for rash.   Neurological: Positive for dizziness and headaches. Negative for seizures, syncope and weakness.   Hematological: Does not bruise/bleed easily.       Physical Exam     Initial Vitals [08/29/17 1051]   BP Pulse Resp Temp SpO2   112/85 103 18 98.5 °F (36.9 °C) 100 %      MAP       94         Physical Exam    Nursing note and vitals reviewed.  Constitutional: She appears well-developed and well-nourished. No distress.   HENT:   Head: Normocephalic and atraumatic.   Right Ear: External ear and ear canal normal.   Left Ear: External ear and ear canal normal.   Nose: Nose normal.   Fluid noted behind bilateral TM's with no erythema or retraction.    Eyes: Conjunctivae are normal. Pupils are equal, round, and reactive to light. Right eye exhibits no discharge. Left eye exhibits no discharge.   Neck: Normal range of motion and full passive range of motion without pain. Neck supple.   Cardiovascular: Normal rate, regular rhythm and normal heart sounds. Exam reveals no gallop and no friction rub.    No murmur heard.  Pulmonary/Chest: Breath sounds normal. She has no wheezes. She has no rhonchi. She has no rales.   Abdominal: Soft. Bowel sounds are normal. There is no tenderness. There is no guarding.   Musculoskeletal: Normal range of motion.   Neurological: She is alert and oriented to person, place, and time. She has normal strength. No sensory deficit. Coordination and gait normal.   Cranial nerves II-XII intact   Skin: Skin is warm and dry.         ED Course   Procedures  Labs Reviewed - No data to  display          Medical Decision Making:   History:   I obtained history from: someone other than patient.  Old Medical Records: I decided to obtain old medical records.       APC / Resident Notes:   This is an emergent evaluation of a 31 year old female who presents with acute on chronic headache and bilateral ear pain. She is well appearing. She is ambulating in ED without difficulty. Her neuro exam is normal. She denied fever or nuchal rigidity, I doubt meningitis. She denied sudden onset of symptoms or clap headache. Her symptoms are consistent with her previous headaches and there is low suspicion for acute intra-cranial process. She was given benadryl, Toradol and phenergan with resolution of her symptoms. Discussed results with patient. Return precautions given. Patient is to follow up with their primary care provider. Case was discussed with Dr. Cabello who is in agreement with the plan of care. All questions answered.                 ED Course     Clinical Impression:   The primary encounter diagnosis was Acute nonintractable headache, unspecified headache type. A diagnosis of Bilateral acute serous otitis media, recurrence not specified was also pertinent to this visit.                           Iva Fraser PA-C  08/29/17 1760

## 2017-08-29 NOTE — DISCHARGE INSTRUCTIONS
Use the nasal spray and take the decongestant to help with the fluid behind your ears.  If symptoms don't improve in the next 48 hours then you can start the antibiotics.   See ENT if symptoms don't improve.

## 2017-09-21 ENCOUNTER — TELEPHONE (OUTPATIENT)
Dept: NEUROLOGY | Facility: CLINIC | Age: 32
End: 2017-09-21

## 2017-09-21 NOTE — TELEPHONE ENCOUNTER
----- Message from Jennie Morin sent at 9/21/2017  3:15 PM CDT -----  Contact: [phone]  Schedule a new patient appointment for migraines as soon as possible.  Call [phone].

## 2017-09-28 ENCOUNTER — PATIENT MESSAGE (OUTPATIENT)
Dept: NEUROLOGY | Facility: CLINIC | Age: 32
End: 2017-09-28

## 2017-10-03 ENCOUNTER — PATIENT MESSAGE (OUTPATIENT)
Dept: NEUROLOGY | Facility: CLINIC | Age: 32
End: 2017-10-03

## 2017-10-09 ENCOUNTER — PATIENT MESSAGE (OUTPATIENT)
Dept: NEUROLOGY | Facility: CLINIC | Age: 32
End: 2017-10-09

## 2017-10-11 ENCOUNTER — OFFICE VISIT (OUTPATIENT)
Dept: NEUROLOGY | Facility: CLINIC | Age: 32
End: 2017-10-11
Payer: MEDICARE

## 2017-10-11 VITALS — HEART RATE: 75 BPM | DIASTOLIC BLOOD PRESSURE: 82 MMHG | SYSTOLIC BLOOD PRESSURE: 117 MMHG | HEIGHT: 64 IN

## 2017-10-11 DIAGNOSIS — R51.9 CHRONIC DAILY HEADACHE: ICD-10-CM

## 2017-10-11 DIAGNOSIS — G47.9 TROUBLE IN SLEEPING: ICD-10-CM

## 2017-10-11 DIAGNOSIS — E55.9 VITAMIN D DEFICIENCY: ICD-10-CM

## 2017-10-11 DIAGNOSIS — G43.719 INTRACTABLE CHRONIC MIGRAINE WITHOUT AURA AND WITHOUT STATUS MIGRAINOSUS: Primary | ICD-10-CM

## 2017-10-11 DIAGNOSIS — R53.83 FATIGUE, UNSPECIFIED TYPE: ICD-10-CM

## 2017-10-11 DIAGNOSIS — R51.9 WORSENING HEADACHES: ICD-10-CM

## 2017-10-11 DIAGNOSIS — F32.A DEPRESSION, UNSPECIFIED DEPRESSION TYPE: ICD-10-CM

## 2017-10-11 PROBLEM — G43.709 CHRONIC MIGRAINE WITHOUT AURA: Status: ACTIVE | Noted: 2017-10-11

## 2017-10-11 PROCEDURE — 99204 OFFICE O/P NEW MOD 45 MIN: CPT | Mod: S$PBB,,, | Performed by: NURSE PRACTITIONER

## 2017-10-11 PROCEDURE — 99999 PR PBB SHADOW E&M-EST. PATIENT-LVL III: CPT | Mod: PBBFAC,,, | Performed by: NURSE PRACTITIONER

## 2017-10-11 PROCEDURE — 99213 OFFICE O/P EST LOW 20 MIN: CPT | Mod: PBBFAC | Performed by: NURSE PRACTITIONER

## 2017-10-11 RX ORDER — PREDNISONE 20 MG/1
TABLET ORAL
Qty: 30 TABLET | Refills: 0 | Status: SHIPPED | OUTPATIENT
Start: 2017-10-11 | End: 2017-11-08 | Stop reason: ALTCHOICE

## 2017-10-11 RX ORDER — SUMATRIPTAN SUCCINATE 4 MG/.5ML
INJECTION, SOLUTION SUBCUTANEOUS
Qty: 12 EACH | Refills: 5 | Status: SHIPPED | OUTPATIENT
Start: 2017-10-11 | End: 2018-04-26 | Stop reason: SDUPTHER

## 2017-10-11 RX ORDER — AMITRIPTYLINE HYDROCHLORIDE 25 MG/1
TABLET, FILM COATED ORAL
Qty: 60 TABLET | Refills: 1 | Status: SHIPPED | OUTPATIENT
Start: 2017-10-11 | End: 2017-11-08 | Stop reason: SINTOL
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
## 2017-10-12 ENCOUNTER — PATIENT MESSAGE (OUTPATIENT)
Dept: NEUROLOGY | Facility: CLINIC | Age: 32
End: 2017-10-12

## 2017-10-18 ENCOUNTER — HOSPITAL ENCOUNTER (EMERGENCY)
Facility: HOSPITAL | Age: 32
Discharge: HOME | End: 2017-10-18
Attending: EMERGENCY MEDICINE
Payer: MEDICARE

## 2017-10-18 VITALS
RESPIRATION RATE: 18 BRPM | HEIGHT: 64 IN | TEMPERATURE: 97 F | HEART RATE: 79 BPM | WEIGHT: 130 LBS | BODY MASS INDEX: 22.2 KG/M2 | DIASTOLIC BLOOD PRESSURE: 79 MMHG | OXYGEN SATURATION: 100 % | SYSTOLIC BLOOD PRESSURE: 111 MMHG

## 2017-10-18 DIAGNOSIS — G43.909 MIGRAINE WITHOUT STATUS MIGRAINOSUS, NOT INTRACTABLE, UNSPECIFIED MIGRAINE TYPE: Primary | ICD-10-CM

## 2017-10-18 DIAGNOSIS — G43.709 CHRONIC MIGRAINE WITHOUT AURA WITHOUT STATUS MIGRAINOSUS, NOT INTRACTABLE: Primary | ICD-10-CM

## 2017-10-18 PROCEDURE — 99284 EMERGENCY DEPT VISIT MOD MDM: CPT | Mod: 25

## 2017-10-18 PROCEDURE — 96375 TX/PRO/DX INJ NEW DRUG ADDON: CPT

## 2017-10-18 PROCEDURE — 96365 THER/PROPH/DIAG IV INF INIT: CPT

## 2017-10-18 PROCEDURE — 25000003 PHARM REV CODE 250: Performed by: EMERGENCY MEDICINE

## 2017-10-18 PROCEDURE — 63600175 PHARM REV CODE 636 W HCPCS: Performed by: EMERGENCY MEDICINE

## 2017-10-18 RX ORDER — DIPHENHYDRAMINE HYDROCHLORIDE 50 MG/ML
25 INJECTION INTRAMUSCULAR; INTRAVENOUS
Status: COMPLETED | OUTPATIENT
Start: 2017-10-18 | End: 2017-10-18

## 2017-10-18 RX ORDER — KETOROLAC TROMETHAMINE 30 MG/ML
30 INJECTION, SOLUTION INTRAMUSCULAR; INTRAVENOUS
Status: COMPLETED | OUTPATIENT
Start: 2017-10-18 | End: 2017-10-18

## 2017-10-18 RX ADMIN — KETOROLAC TROMETHAMINE 30 MG: 30 INJECTION, SOLUTION INTRAMUSCULAR at 06:10

## 2017-10-18 RX ADMIN — PROMETHAZINE HYDROCHLORIDE 25 MG: 25 INJECTION INTRAMUSCULAR; INTRAVENOUS at 06:10

## 2017-10-18 RX ADMIN — SODIUM CHLORIDE 1000 ML: 0.9 INJECTION, SOLUTION INTRAVENOUS at 06:10

## 2017-10-18 RX ADMIN — DIPHENHYDRAMINE HYDROCHLORIDE 25 MG: 50 INJECTION, SOLUTION INTRAMUSCULAR; INTRAVENOUS at 06:10

## 2017-10-18 NOTE — DISCHARGE INSTRUCTIONS
Take your medications as prescribed.  Follow up with your primary care physician.  Follow-up with neurology for further management of your migraines.  Return to the emergency department if you've increased pain, fever that does not respond to medications, rash or any other concerns.  Please refer to the additional material providing further information including when return to the emergency department.

## 2017-10-18 NOTE — PROVIDER PROGRESS NOTES - EMERGENCY DEPT.
Encounter Date: 10/18/2017    ED Physician Progress Notes           This is an assumption of care note    Case accepted from Dr. Acosta at 0615, pending response to treatment  I agree with previous physician's history/physical and overall assessment.   This is a 31-year-old female with a history of migraines who presents to the emergency department with her usual presentation.  She did take her home medication of Imitrex at home but did not help with her headache.        PHYSICAL EXAMINATION:  GENERAL:   Alert and oriented x3, no acute distress  VITAL SIGNS:  Per nurse's note, reviewed by me .  SKIN:  Warm, dry; no cyanosis; no rash, no pallor  HEAD:  Atraumatic, normocephalic, no scalp swelling, no tenderness.  EYES:  no conjunctival pallor, no scleral icterus, EOMI.  ENMT:  Pharynx:  no erythema; airway patent: no stridor; mucous membranes moist  NECK:  no tenderness, normal ROM, no lymphadenopathy.  CHEST AND RESPIRATORY:  No distress, no rales, no rhonchi, no wheezes.  HEART AND CARDIOVASCULAR:  Normal rate, no irregularity; no murmur,   ABDOMEN AND GI:  Soft; no tenderness, no guarding, no rebound, no masses  EXTREMITIES:  no deformity, no edema, no tenderness.  NEURO AND PSYCH:  Mental status as above.  Cranial nerves grossly intact; strength symmetric, sensation grossly intact bilaterally. Gait normal.  ,    0745 Patient improved with treatment in the emergency department and comfortable going home. Discussed reasons to return and importance of followup.  Patient understands that the emergency visit today is primarily to address immediate concerns and to rule out emergent cause of symptoms and that they may require further workup and evaluation as an outpatient. All questions addressed and patient given discharge instructions and followup information.     Disposition: Discharged  Diagnosis: Migraines, non-intractable

## 2017-10-18 NOTE — ED NOTES
Pt. presents with chronic migraine located to R temporal area. HA is constant. Described as throbbing. Radiates to R lateral neck. No obvious stiffness, fever or chill. Denies vision changes. Denies weakness or fatigue. Reports nausea and photophobia. Denies dizziness or room spinning. States this is typical of her migraines. Has not had migraine Botox treatments in over 3 months-this is abnormal for pt.-per pt. Unable to assess gait and ambulation at present time. Pt. laying on stretcher with eyes closed, hands over face, refusing to make eye contact, lights off and moaning. Side rails up. Call bell in reach.

## 2017-10-18 NOTE — ED PROVIDER NOTES
"Encounter Date: 10/18/2017    SCRIBE #1 NOTE: I, Izabel Figueroa, am scribing for, and in the presence of, Dr. Acosta.       History     Chief Complaint   Patient presents with    Migraine     pt to triage ambulatory and reports is having a migraine headache and beganm at 12 noon and pt takes imitrex but only small amount of relief; pt also reports vomiting; no vomiting at present; right temporal, neck and right posterior headache and throbbing and constant     This is a 31 y.o. female who has a past medical history of Abnormal Pap smear; Anxiety; Complication of anesthesia; Depression;   History of psychiatric hospitalization; Insomnia; Migraine; Migraine headache; Seizure (9/10/2013); and Seizures.   The patient presents to the Emergency Department with migraine.  The patient has frequent, chronic migraines.   She states there was a change in her neurologist and she can no longer get Botox for her migraines.   She is 3 months past her normal timing of Botox treatments. The patient is in process of reestablishing this treatment with another neurologist.   The patient states this headache is very typical of her migraines but did not improve after Imitrex.     Pt has a past surgical history that includes Tubal ligation; Deep brain stimulator placement; Hysterectomy; and implant.           Review of patient's allergies indicates:   Allergen Reactions    Compazine [prochlorperazine edisylate] Other (See Comments)     "I pretty much go crazy"    Compazine [prochlorperazine]     Reglan [metoclopramide]      "I go crazy"    Thorazine [chlorpromazine]      Makes her "antsy' "edgy", and itch    Penicillins Rash     Past Medical History:   Diagnosis Date    Abnormal Pap smear     Anxiety     Complication of anesthesia     Depression     History of psychiatric hospitalization     Insomnia     Migraine     Migraine headache     Seizure 9/10/2013    Seizures      Past Surgical History:   Procedure Laterality Date "    DEEP BRAIN STIMULATOR PLACEMENT      HYSTERECTOMY      implant      to treat sz    TUBAL LIGATION       Family History   Problem Relation Age of Onset    Migraines Father     Diabetes Maternal Aunt     Hyperlipidemia Mother     Migraines Paternal Aunt     Migraines Paternal Grandmother      Social History   Substance Use Topics    Smoking status: Current Every Day Smoker     Packs/day: 0.50     Types: Cigarettes    Smokeless tobacco: Never Used    Alcohol use Yes      Comment: occasionally     Review of Systems   Constitutional: Negative for fever.   HENT: Negative for facial swelling.    Eyes: Positive for photophobia.   Respiratory: Negative for shortness of breath.    Cardiovascular: Negative for chest pain.   Gastrointestinal: Negative for abdominal pain.   Genitourinary: Negative for difficulty urinating.   Musculoskeletal: Negative for gait problem.   Skin: Negative for rash.   Neurological: Positive for headaches.       Physical Exam     Initial Vitals [10/18/17 0547]   BP Pulse Resp Temp SpO2   (!) 129/93 78 18 97.9 °F (36.6 °C) 100 %      MAP       105         Physical Exam    Nursing note and vitals reviewed.  Constitutional: She appears well-developed and well-nourished. No distress.   HENT:   Head: Normocephalic and atraumatic.   Mouth/Throat: Oropharynx is clear and moist.   Eyes: Conjunctivae are normal. Pupils are equal, round, and reactive to light.   Neck: Normal range of motion. Neck supple.   Cardiovascular: Normal rate, regular rhythm and normal heart sounds.   Pulmonary/Chest: Breath sounds normal. No respiratory distress. She has no wheezes. She has no rhonchi. She has no rales.   Abdominal: Soft. Bowel sounds are normal. She exhibits no distension. There is no tenderness.   Musculoskeletal: Normal range of motion. She exhibits no edema or tenderness.   Lymphadenopathy:     She has no cervical adenopathy.   Neurological: She is alert and oriented to person, place, and time.    Skin: Skin is warm and dry. Capillary refill takes less than 2 seconds. No rash noted. No erythema.   Psychiatric: She has a normal mood and affect. Thought content normal.         ED Course   Procedures  Labs Reviewed - No data to display          Medical Decision Making:   History:   Old Medical Records: I decided to obtain old medical records.  Initial Assessment:   This is a 31 y.o. female who presents with headache. I believe the patient has a migraine headache based upon the history and physical exam and pt course in the ED.  The patient's headaches are similar to their prior headaches for which they have been diagnosed as migraines in the past.  The patient has had a prior negative CT Head.  They have no focal weakness, numbness, meningismus, other focal neurologic deficit, history of trauma, fevers, elevated blood pressure to suggest meningitis, subarachnoid hemorrhage, TIA, stroke, mass, or hypertensive urgency.  I will treat the patient supportively. Results, clinical impression and rx discussed with patient. Pt to call for follow up with PCP or referred physician in 2-3 days. Pt is to return to the ED immediately for any new or worsening symptoms, including but not limited to: fever, chills, worsening pain, nausea/vomiting, weakness/fatigue, or for any other concerns.  Pt had time for ask questions to which they were addressed. Pt expressed understanding.                  Attending Attestation:           Physician Attestation for Scribe:      Comments: I, Dr. Anton Acosta, personally performed the services described in this documentation.   All medical record entries made by the scribe were at my direction and in my presence.   I have reviewed the chart and agree that the record is accurate and complete.   Anton Acosta MD.  5:20 AM 10/19/2017       Attending ED Notes:   Pt was re-evaluated and turned over to Dr. Shine at 0600 in stable condition. Pt is pending treatment and re-evaluation and disposition.            ED Course      Clinical Impression:   The encounter diagnosis was Migraine without status migrainosus, not intractable, unspecified migraine type.                           Anton Acosta MD  10/19/17 2985

## 2017-11-01 ENCOUNTER — TELEPHONE (OUTPATIENT)
Dept: NEUROLOGY | Facility: CLINIC | Age: 32
End: 2017-11-01

## 2017-11-01 ENCOUNTER — PATIENT MESSAGE (OUTPATIENT)
Dept: NEUROLOGY | Facility: CLINIC | Age: 32
End: 2017-11-01

## 2017-11-01 NOTE — TELEPHONE ENCOUNTER
Called and spoke to Patient.Instructions given per Patricia Wing.Patient to come in next week to be seen.She stated her lips were blue this am but then cleared.She relates this to the dosage of 2 tablets.Taper schedule reviewed with her and she is aware that is any effects that she deems severe to notify clinic.

## 2017-11-08 ENCOUNTER — PROCEDURE VISIT (OUTPATIENT)
Dept: NEUROLOGY | Facility: CLINIC | Age: 32
End: 2017-11-08
Payer: MEDICARE

## 2017-11-08 VITALS
DIASTOLIC BLOOD PRESSURE: 83 MMHG | HEIGHT: 64 IN | HEART RATE: 84 BPM | SYSTOLIC BLOOD PRESSURE: 119 MMHG | BODY MASS INDEX: 24.42 KG/M2 | WEIGHT: 143.06 LBS

## 2017-11-08 DIAGNOSIS — G43.709 CHRONIC MIGRAINE WITHOUT AURA WITHOUT STATUS MIGRAINOSUS, NOT INTRACTABLE: Primary | ICD-10-CM

## 2017-11-08 DIAGNOSIS — G47.00 INSOMNIA, UNSPECIFIED TYPE: ICD-10-CM

## 2017-11-08 PROCEDURE — 64615 CHEMODENERV MUSC MIGRAINE: CPT | Mod: PBBFAC | Performed by: NURSE PRACTITIONER

## 2017-11-08 PROCEDURE — 64615 CHEMODENERV MUSC MIGRAINE: CPT | Mod: S$PBB,,, | Performed by: NURSE PRACTITIONER

## 2017-11-08 RX ORDER — SUMATRIPTAN SUCCINATE 4 MG/.5ML
INJECTION, SOLUTION SUBCUTANEOUS
Refills: 5 | COMMUNITY
Start: 2017-10-12 | End: 2017-11-08 | Stop reason: SDUPTHER

## 2017-11-08 RX ORDER — KETOROLAC TROMETHAMINE 30 MG/ML
30 INJECTION, SOLUTION INTRAMUSCULAR; INTRAVENOUS
Qty: 6 SYRINGE | Refills: 3 | Status: SHIPPED | OUTPATIENT
Start: 2017-11-08 | End: 2018-02-09

## 2017-11-08 RX ORDER — METHYLPREDNISOLONE 4 MG/1
TABLET ORAL
Refills: 0 | COMMUNITY
Start: 2017-08-16 | End: 2017-11-08 | Stop reason: ALTCHOICE

## 2017-11-08 RX ORDER — CLONAZEPAM 0.5 MG/1
TABLET ORAL
COMMUNITY
Start: 2017-09-27 | End: 2017-11-08

## 2017-11-08 RX ADMIN — ONABOTULINUMTOXINA 200 UNITS: 100 INJECTION, POWDER, LYOPHILIZED, FOR SOLUTION INTRADERMAL; INTRAMUSCULAR at 10:11
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
## 2017-11-10 ENCOUNTER — TELEPHONE (OUTPATIENT)
Dept: NEUROLOGY | Facility: CLINIC | Age: 32
End: 2017-11-10

## 2017-11-13 ENCOUNTER — TELEPHONE (OUTPATIENT)
Dept: NEUROLOGY | Facility: CLINIC | Age: 32
End: 2017-11-13

## 2018-01-26 DIAGNOSIS — G43.709 CHRONIC MIGRAINE WITHOUT AURA WITHOUT STATUS MIGRAINOSUS, NOT INTRACTABLE: Primary | ICD-10-CM

## 2018-02-09 ENCOUNTER — PROCEDURE VISIT (OUTPATIENT)
Dept: NEUROLOGY | Facility: CLINIC | Age: 33
End: 2018-02-09
Payer: MEDICARE

## 2018-02-09 VITALS
WEIGHT: 138 LBS | SYSTOLIC BLOOD PRESSURE: 118 MMHG | HEART RATE: 67 BPM | HEIGHT: 64 IN | DIASTOLIC BLOOD PRESSURE: 82 MMHG | BODY MASS INDEX: 23.56 KG/M2

## 2018-02-09 DIAGNOSIS — G43.709 CHRONIC MIGRAINE WITHOUT AURA WITHOUT STATUS MIGRAINOSUS, NOT INTRACTABLE: Primary | ICD-10-CM

## 2018-02-09 PROCEDURE — 64615 CHEMODENERV MUSC MIGRAINE: CPT | Mod: S$PBB,,, | Performed by: NURSE PRACTITIONER

## 2018-02-09 PROCEDURE — 64615 CHEMODENERV MUSC MIGRAINE: CPT | Mod: PBBFAC | Performed by: NURSE PRACTITIONER

## 2018-02-09 RX ORDER — KETOROLAC TROMETHAMINE 30 MG/ML
30 INJECTION, SOLUTION INTRAMUSCULAR; INTRAVENOUS
Qty: 6 SYRINGE | Refills: 3 | Status: SHIPPED | OUTPATIENT
Start: 2018-02-09 | End: 2018-04-26 | Stop reason: SDUPTHER

## 2018-02-09 RX ADMIN — ONABOTULINUMTOXINA 200 UNITS: 100 INJECTION, POWDER, LYOPHILIZED, FOR SOLUTION INTRADERMAL; INTRAMUSCULAR at 10:02

## 2018-02-09 NOTE — PROCEDURES
"SUBJECTIVE:  Patient ID: Mily Shore  Chief Complaint: Headache; Follow-up; and Botulinum Toxin Injection    History of Present Illness:  32 y.o. female with migraines, anxiety, and depression, who presents to clinic alone for follow-up of headaches and repeat botox injections.     Interval History:  Headaches were doing well for a while, but noticed they came back about 3 weeks prior to when she was due for her next Botox injections.  She was unable to get toradol injections because her [facility] closed.  She is requesting it be sent to a different pharmacy.  She is very happy with the effect the Botox injections has on her migraines and would like to continue with injections.      Current Medications:    sumatriptan succinate 4 mg/0.5 mL PnIj, 1 injection at onset of migraine, can repeat 1 hour later.  No more than 3 injections/day. No more than 3 days use per week., Disp: 12 each, Rfl: 5    ketorolac 30 mg/mL Syrg, Inject 30 mg as directed every 6 to 8 hours as needed., Disp: 6 Syringe, Rfl: 3  Current Facility-Administered Medications:     onabotulinumtoxina injection 200 Units, 200 Units, Intramuscular, Q90 Days, Patricia Wing, FNP, 200 Units at 02/09/18 1028    Review of Systems - as per HPI, otherwise a balanced 10 systems review is negative.    OBJECTIVE:  Vitals:  /82 (BP Location: Left arm, Patient Position: Sitting, BP Method: Large (Automatic))   Pulse 67   Ht 5' 4" (1.626 m)   Wt 62.6 kg (138 lb)   BMI 23.69 kg/m²     Review of Data:   Labs:  No visits with results within 3 Month(s) from this visit.   Latest known visit with results is:   Lab Visit on 10/11/2017   Component Date Value Ref Range Status    WBC 10/11/2017 6.70  3.90 - 12.70 K/uL Final    RBC 10/11/2017 4.38  4.00 - 5.40 M/uL Final    Hemoglobin 10/11/2017 13.1  12.0 - 16.0 g/dL Final    Hematocrit 10/11/2017 38.4  37.0 - 48.5 % Final    MCV 10/11/2017 88  82 - 98 fL Final    MCH 10/11/2017 29.9  27.0 - 31.0 pg " Final    MCHC 10/11/2017 34.1  32.0 - 36.0 g/dL Final    RDW 10/11/2017 13.2  11.5 - 14.5 % Final    Platelets 10/11/2017 220  150 - 350 K/uL Final    MPV 10/11/2017 11.5  9.2 - 12.9 fL Final    Gran # (ANC) 10/11/2017 4.7  1.8 - 7.7 K/uL Final    Lymph # 10/11/2017 1.6  1.0 - 4.8 K/uL Final    Mono # 10/11/2017 0.3  0.3 - 1.0 K/uL Final    Eos # 10/11/2017 0.1  0.0 - 0.5 K/uL Final    Baso # 10/11/2017 0.01  0.00 - 0.20 K/uL Final    Gran% 10/11/2017 70.0  38.0 - 73.0 % Final    Lymph% 10/11/2017 23.3  18.0 - 48.0 % Final    Mono% 10/11/2017 4.9  4.0 - 15.0 % Final    Eosinophil% 10/11/2017 1.6  0.0 - 8.0 % Final    Basophil% 10/11/2017 0.1  0.0 - 1.9 % Final    Differential Method 10/11/2017 Automated   Final    Sodium 10/11/2017 142  136 - 145 mmol/L Final    Potassium 10/11/2017 4.2  3.5 - 5.1 mmol/L Final    Chloride 10/11/2017 110  95 - 110 mmol/L Final    CO2 10/11/2017 25  23 - 29 mmol/L Final    Glucose 10/11/2017 85  70 - 110 mg/dL Final    BUN, Bld 10/11/2017 9  6 - 20 mg/dL Final    Creatinine 10/11/2017 0.7  0.5 - 1.4 mg/dL Final    Calcium 10/11/2017 8.9  8.7 - 10.5 mg/dL Final    Total Protein 10/11/2017 7.0  6.0 - 8.4 g/dL Final    Albumin 10/11/2017 4.0  3.5 - 5.2 g/dL Final    Total Bilirubin 10/11/2017 1.2* 0.1 - 1.0 mg/dL Final    Alkaline Phosphatase 10/11/2017 60  55 - 135 U/L Final    AST 10/11/2017 14  10 - 40 U/L Final    ALT 10/11/2017 21  10 - 44 U/L Final    Anion Gap 10/11/2017 7* 8 - 16 mmol/L Final    eGFR if African American 10/11/2017 >60.0  >60 mL/min/1.73 m^2 Final    eGFR if non African American 10/11/2017 >60.0  >60 mL/min/1.73 m^2 Final    Vit D, 25-Hydroxy 10/11/2017 42  30 - 96 ng/mL Final    TSH 10/11/2017 0.308* 0.400 - 4.000 uIU/mL Final    Free T4 10/11/2017 0.93  0.71 - 1.51 ng/dL Final    T3, Free 10/11/2017 2.7  2.3 - 4.2 pg/mL Final     Imaging:  Results for orders placed or performed during the hospital encounter of 06/07/15   CT  Head Without Contrast    Narrative    CT HEAD WITHOUT CONTRAST    History:  Seven onset headache    Technique: 5 mm axial images were obtained from the vertex to the skullbase, without administration of contrast.    Comparison: 12/3/14    FINDINGS:    Gray white differentiation is well maintained.  Two metallic wires are seen within the subcutaneous soft tissues along the right convexity extending to the right supraorbital region..  Gray white differentiation is well maintained.  There is no   hemorrhage, contusion, or extra-axial collection.  No mass or mass effect.  The ventricles are normal in size and configuration.    The calvarium is intact. Visualized portions of the paranasal sinuses and mastoid air cells are clear.    Impression         No acute intracranial process.   No significant change.          Electronically signed by: Sarkis Dallas MD  Date:     06/08/15  Time:    10:20    Results for orders placed or performed during the hospital encounter of 04/17/12   X-Ray Skull Ltd Less Than 4 Views    Narrative    DATE OF EXAM: Apr 17 2012   BDX   0180  -  SKULL LESS THAN 4 VIEWS:     [id number]     CLINICAL HISTORY:   neurostim implant     ICD 9 CODE(S):   ()  CPT 4 CODE(S)/MODIFIER(S):   ()     Skull: Back AP view skull and surgery.  Please refer to surgeons report   for diagnosis and description.  ______________________________________      Electronically signed by: MARTINA HOLLAND MD  Date:     04/18/12  Time:    09:08         Skull: Back AP view skull and surgery.  Please refer to surgeons report   for diagnosis and description.  ______________________________________      Electronically signed by: MARTINA HOLLAND MD  Date:     04/18/12  Time:    09:08            : ASHA  Transcribe Date/Time: Apr 18 2012  9:09A  Dictated by : MARTINA HOLLAND MD  Read On:      Images were reviewed, findings were verified and document was   electronically  SIGNED BY: MARTINA HOLLAND MD On: Apr 18 2012   9:09A        Note: I have independently reviewed any/all imaging/labs/tests and agree with the report (s) as documented.  Any discrepancies will be as noted/demarcated by free text.  TRAV CORONA 2/9/2018    ASSESSMENT:  1. Chronic migraine without aura without status migrainosus, not intractable      PLAN:  - Botox administered in clinic for Chronic Migraine (see below)   - Refilled Toradol injections   - Continue all other medications as directed   - RTC in 3 months for repeat Botox injections or sooner if needed     Orders Placed This Encounter    ketorolac 30 mg/mL Syrg     All questions and concerns addressed.  Patient verbalizes understanding and is agreeable with the above stated treatment plan.      PROCEDURE NOTE:  BOTOX was performed as an indicated therapy for intractable chronic migraine headaches given that the patient failed more than 2 headache medications    Two patient identifiers were confirmed with the patient prior to performing this procedure. A time out to determine correct patient and and agreement on procedure performed was conducted prior to the procedure.      Botulinum Toxin Injection Procedure   Procedure: Chemical neurolysis   After risks and benefits were explained including bleeding, infection, worsening of pain, damage to the areas being injected, weakness of muscles, loss of muscle control, dysphagia if injecting the head or neck, facial droop if injecting the facial area, painful injection, allergic or other reaction to the medications being injected, and the failure of the procedure to help the problem, a signed consent was obtained.   The patient was placed in a comfortable area and the sites to be treated were identified.The area to be treated was prepped three times with alcohol and the alcohol allowed to dry. Next, a 30 gauge needle was used to inject the medication in the area to be treated.      Total Botox used: 155 Units   Botox wastage: 45 Units     Injection sites:     muscle bilaterally ( a total of 10 units divided into 2 sites)   Procerus muscle (5 units)   Frontalis muscle bilaterally (a total of 20 units divided into 4 sites)   Temporalis muscle bilaterally (a total of 40 units divided into 8 sites)   Occipitalis muscle bilaterally (a total of 30 units divided into 6 sites)   Cervical paraspinal muscles (a total of 20 units divided into 4 sites)   Trapezius muscle bilaterally (a total of 30 units divided into 6 sites)   Complications: none   RTC for the next Botox injection: 3 months     CC: MD Patricia Canales, FNP-C  [facility]   [phone]

## 2018-04-26 ENCOUNTER — PROCEDURE VISIT (OUTPATIENT)
Dept: NEUROLOGY | Facility: CLINIC | Age: 33
End: 2018-04-26
Payer: MEDICARE

## 2018-04-26 VITALS
DIASTOLIC BLOOD PRESSURE: 78 MMHG | HEART RATE: 75 BPM | WEIGHT: 140 LBS | HEIGHT: 64 IN | SYSTOLIC BLOOD PRESSURE: 106 MMHG | BODY MASS INDEX: 23.9 KG/M2

## 2018-04-26 DIAGNOSIS — T85.199S MALFUNCTION OF NERVE STIMULATOR LEAD, SEQUELA: ICD-10-CM

## 2018-04-26 DIAGNOSIS — R51.9 WORSENING HEADACHES: ICD-10-CM

## 2018-04-26 DIAGNOSIS — F32.A DEPRESSION, UNSPECIFIED DEPRESSION TYPE: ICD-10-CM

## 2018-04-26 DIAGNOSIS — G43.709 CHRONIC MIGRAINE WITHOUT AURA WITHOUT STATUS MIGRAINOSUS, NOT INTRACTABLE: Primary | ICD-10-CM

## 2018-04-26 DIAGNOSIS — G47.9 TROUBLE IN SLEEPING: ICD-10-CM

## 2018-04-26 DIAGNOSIS — G43.719 INTRACTABLE CHRONIC MIGRAINE WITHOUT AURA AND WITHOUT STATUS MIGRAINOSUS: ICD-10-CM

## 2018-04-26 PROCEDURE — 64615 CHEMODENERV MUSC MIGRAINE: CPT | Mod: PBBFAC | Performed by: NURSE PRACTITIONER

## 2018-04-26 PROCEDURE — 64615 CHEMODENERV MUSC MIGRAINE: CPT | Mod: S$PBB,,, | Performed by: NURSE PRACTITIONER

## 2018-04-26 RX ORDER — KETOROLAC TROMETHAMINE 15.75 MG/1
15.75 SPRAY, METERED NASAL EVERY 6 HOURS
Qty: 5 EACH | Refills: 5 | Status: SHIPPED | OUTPATIENT
Start: 2018-04-26

## 2018-04-26 RX ORDER — KETOROLAC TROMETHAMINE 30 MG/ML
30 INJECTION, SOLUTION INTRAMUSCULAR; INTRAVENOUS
Qty: 6 SYRINGE | Refills: 3 | Status: SHIPPED | OUTPATIENT
Start: 2018-04-26

## 2018-04-26 RX ORDER — SUMATRIPTAN SUCCINATE 4 MG/.5ML
INJECTION, SOLUTION SUBCUTANEOUS
Qty: 12 EACH | Refills: 5 | Status: SHIPPED | OUTPATIENT
Start: 2018-04-26

## 2018-04-26 RX ORDER — KETOROLAC TROMETHAMINE 30 MG/ML
60 INJECTION, SOLUTION INTRAMUSCULAR; INTRAVENOUS
Status: COMPLETED | OUTPATIENT
Start: 2018-04-26 | End: 2018-04-26

## 2018-04-26 RX ADMIN — ONABOTULINUMTOXINA 200 UNITS: 100 INJECTION, POWDER, LYOPHILIZED, FOR SOLUTION INTRADERMAL; INTRAMUSCULAR at 11:04

## 2018-04-26 RX ADMIN — KETOROLAC TROMETHAMINE 60 MG: 30 INJECTION, SOLUTION INTRAMUSCULAR at 11:04

## 2018-04-26 NOTE — PROCEDURES
"SUBJECTIVE:  Patient ID: Mily Shore  Chief Complaint: Headache and Botulinum Toxin Injection    History of Present Illness:  Mily Shore is a 32 y.o. female who presents to clinic alone for follow-up of headaches and Botox injections.     Recommendations made at last Office Visit on 2/9/2018:  - Botox administered in clinic for Chronic Migraine (see below)   - Refilled Toradol injections   - Continue all other medications as directed   - RTC in 3 months for repeat Botox injections or sooner if needed     04/26/2018- Interval History:  Headaches have been "rough", had a big emotional issue within the last 12 weeks.  States she has had a headache rated 5 out of 10 for the 2 weeks.  Headaches have been bad for the last 4 weeks, but is under a lot of stress with her ex- and custody of their children.  She has not been able to get toradol injections at her pharmacy, but she does have sumatriptan injectable available which is effective for migraine abortive.  She has contacted Dr. Tillman's office regarding her peripheral nerve stimulator removed as it does not work and has not in years, however first available appointment is in June.  She is very happy with the response of her headaches to Botox injections and does wish to continue with this treatment plan.      Current Medications:    ketorolac 30 mg/mL Syrg, Inject 30 mg as directed every 6 to 8 hours as needed., Disp: 6 Syringe, Rfl: 3    SUMAtriptan succinate 4 mg/0.5 mL PnIj, 1 injection at onset of migraine, can repeat 1 hour later.  No more than 3 injections/day. No more than 3 days use per week., Disp: 12 each, Rfl: 5    ketorolac 15.75 mg/spray Spry, 15.75 mg by Nasal route every 6 (six) hours., Disp: 5 each, Rfl: 5    Current Facility-Administered Medications:     onabotulinumtoxina injection 200 Units, 200 Units, Intramuscular, Q90 Days, LENO Rodriguez, 200 Units at 04/26/18 1105    Review of Systems - as per HPI, " "otherwise a balanced 10 systems review is negative.    OBJECTIVE:  Vitals:  /78 (BP Location: Right arm, Patient Position: Sitting, BP Method: Large (Automatic))   Pulse 75   Ht 5' 4" (1.626 m)   Wt 63.5 kg (140 lb)   BMI 24.03 kg/m²     Review of Data:   Labs:  No visits with results within 3 Month(s) from this visit.   Latest known visit with results is:   Lab Visit on 10/11/2017   Component Date Value Ref Range Status    WBC 10/11/2017 6.70  3.90 - 12.70 K/uL Final    RBC 10/11/2017 4.38  4.00 - 5.40 M/uL Final    Hemoglobin 10/11/2017 13.1  12.0 - 16.0 g/dL Final    Hematocrit 10/11/2017 38.4  37.0 - 48.5 % Final    MCV 10/11/2017 88  82 - 98 fL Final    MCH 10/11/2017 29.9  27.0 - 31.0 pg Final    MCHC 10/11/2017 34.1  32.0 - 36.0 g/dL Final    RDW 10/11/2017 13.2  11.5 - 14.5 % Final    Platelets 10/11/2017 220  150 - 350 K/uL Final    MPV 10/11/2017 11.5  9.2 - 12.9 fL Final    Gran # (ANC) 10/11/2017 4.7  1.8 - 7.7 K/uL Final    Lymph # 10/11/2017 1.6  1.0 - 4.8 K/uL Final    Mono # 10/11/2017 0.3  0.3 - 1.0 K/uL Final    Eos # 10/11/2017 0.1  0.0 - 0.5 K/uL Final    Baso # 10/11/2017 0.01  0.00 - 0.20 K/uL Final    Gran% 10/11/2017 70.0  38.0 - 73.0 % Final    Lymph% 10/11/2017 23.3  18.0 - 48.0 % Final    Mono% 10/11/2017 4.9  4.0 - 15.0 % Final    Eosinophil% 10/11/2017 1.6  0.0 - 8.0 % Final    Basophil% 10/11/2017 0.1  0.0 - 1.9 % Final    Differential Method 10/11/2017 Automated   Final    Sodium 10/11/2017 142  136 - 145 mmol/L Final    Potassium 10/11/2017 4.2  3.5 - 5.1 mmol/L Final    Chloride 10/11/2017 110  95 - 110 mmol/L Final    CO2 10/11/2017 25  23 - 29 mmol/L Final    Glucose 10/11/2017 85  70 - 110 mg/dL Final    BUN, Bld 10/11/2017 9  6 - 20 mg/dL Final    Creatinine 10/11/2017 0.7  0.5 - 1.4 mg/dL Final    Calcium 10/11/2017 8.9  8.7 - 10.5 mg/dL Final    Total Protein 10/11/2017 7.0  6.0 - 8.4 g/dL Final    Albumin 10/11/2017 4.0  3.5 - 5.2 g/dL " Final    Total Bilirubin 10/11/2017 1.2* 0.1 - 1.0 mg/dL Final    Alkaline Phosphatase 10/11/2017 60  55 - 135 U/L Final    AST 10/11/2017 14  10 - 40 U/L Final    ALT 10/11/2017 21  10 - 44 U/L Final    Anion Gap 10/11/2017 7* 8 - 16 mmol/L Final    eGFR if African American 10/11/2017 >60.0  >60 mL/min/1.73 m^2 Final    eGFR if non African American 10/11/2017 >60.0  >60 mL/min/1.73 m^2 Final    Vit D, 25-Hydroxy 10/11/2017 42  30 - 96 ng/mL Final    TSH 10/11/2017 0.308* 0.400 - 4.000 uIU/mL Final    Free T4 10/11/2017 0.93  0.71 - 1.51 ng/dL Final    T3, Free 10/11/2017 2.7  2.3 - 4.2 pg/mL Final     Imaging:  Results for orders placed or performed during the hospital encounter of 06/07/15   CT Head Without Contrast    Narrative    CT HEAD WITHOUT CONTRAST    History:  Seven onset headache    Technique: 5 mm axial images were obtained from the vertex to the skullbase, without administration of contrast.    Comparison: 12/3/14    FINDINGS:    Gray white differentiation is well maintained.  Two metallic wires are seen within the subcutaneous soft tissues along the right convexity extending to the right supraorbital region..  Gray white differentiation is well maintained.  There is no   hemorrhage, contusion, or extra-axial collection.  No mass or mass effect.  The ventricles are normal in size and configuration.    The calvarium is intact. Visualized portions of the paranasal sinuses and mastoid air cells are clear.    Impression         No acute intracranial process.   No significant change.          Electronically signed by: Sarkis Dallas MD  Date:     06/08/15  Time:    10:20    Results for orders placed or performed during the hospital encounter of 04/17/12   X-Ray Skull Ltd Less Than 4 Views    Narrative    DATE OF EXAM: Apr 17 2012   BDX   0180  -  SKULL LESS THAN 4 VIEWS:     [id number]     CLINICAL HISTORY:   neurostim implant     ICD 9 CODE(S):   ()  CPT 4 CODE(S)/MODIFIER(S):   ()     Skull:  Back AP view skull and surgery.  Please refer to surgeons report   for diagnosis and description.  ______________________________________      Electronically signed by: MARTINA HOLLAND MD  Date:     04/18/12  Time:    09:08         Skull: Back AP view skull and surgery.  Please refer to surgeons report   for diagnosis and description.  ______________________________________      Electronically signed by: MARTINA HOLLAND MD  Date:     04/18/12  Time:    09:08            : ASHA  Transcribe Date/Time: Apr 18 2012  9:09A  Dictated by : MARTINA HOLLAND MD  Read On:      Images were reviewed, findings were verified and document was   electronically  SIGNED BY: MARTINA HOLLAND MD On: Apr 18 2012  9:09A        Note: I have independently reviewed any/all imaging/labs/tests and agree with the report (s) as documented.  Any discrepancies will be as noted/demarcated by free text.  CHLOE, LENO-C 4/26/2018    ASSESSMENT:  1. Chronic migraine without aura without status migrainosus, not intractable    2. Intractable chronic migraine without aura and without status migrainosus    3. Worsening headaches    4. Trouble in sleeping    5. Depression, unspecified depression type    6. Malfunction of nerve stimulator lead, sequela      PLAN:  - Botox administered in clinic for Chronic Migraine (see below)   - Toradol 60 mg IM administered in clinic, she was monitored for 15 minutes following injections, no adverse effects present, headache rated 1 out of 10, down from 5 out of 10   - Refilled Sumatriptan injectable  - For rescue therapy - given Sprix nasal spray   - Referral placed to Pain Management Dr. Gardner for consultation regarding removal of stimulator   - RTC in 12 weeks for repeat Botox injections or sooner if needed     Orders Placed This Encounter    Ambulatory Referral to Pain Clinic    SUMAtriptan succinate 4 mg/0.5 mL PnIj    ketorolac 15.75 mg/spray Spry    ketorolac 30 mg/mL Syrg    ketorolac  injection 60 mg     All questions and concerns addressed.  Patient verbalizes understanding and is agreeable with the above stated treatment plan.      PROCEDURE NOTE:  BOTOX was performed as an indicated therapy for intractable chronic migraine headaches given that the patient failed more than 2 headache medications    Two patient identifiers were confirmed with the patient prior to performing this procedure. A time out to determine correct patient and and agreement on procedure performed was conducted prior to the procedure.      Botulinum Toxin Injection Procedure   Procedure: Chemical neurolysis   After risks and benefits were explained including bleeding, infection, worsening of pain, damage to the areas being injected, weakness of muscles, loss of muscle control, dysphagia if injecting the head or neck, facial droop if injecting the facial area, painful injection, allergic or other reaction to the medications being injected, and the failure of the procedure to help the problem, a signed consent was obtained.   The patient was placed in a comfortable area and the sites to be treated were identified.The area to be treated was prepped three times with alcohol and the alcohol allowed to dry. Next, a 30 gauge needle was used to inject the medication in the area to be treated.      Total Botox used: 155 Units   Botox wastage: 45 Units     Injection sites:    muscle bilaterally ( a total of 10 units divided into 2 sites)   Procerus muscle (5 units)   Frontalis muscle bilaterally (a total of 20 units divided into 4 sites)   Temporalis muscle bilaterally (a total of 40 units divided into 8 sites)   Occipitalis muscle bilaterally (a total of 30 units divided into 6 sites)   Cervical paraspinal muscles (a total of 20 units divided into 4 sites)   Trapezius muscle bilaterally (a total of 30 units divided into 6 sites)   Complications: none   RTC for the next Botox injection: 3 months     CC: Reinier Mtaa,  MD Patricia Wing, FNP-C  [facility]   [phone]

## 2018-04-27 ENCOUNTER — TELEPHONE (OUTPATIENT)
Dept: NEUROLOGY | Facility: CLINIC | Age: 33
End: 2018-04-27

## 2018-11-16 ENCOUNTER — OFFICE VISIT (OUTPATIENT)
Dept: URGENT CARE | Facility: CLINIC | Age: 33
End: 2018-11-16
Payer: MEDICARE

## 2018-11-16 VITALS
HEIGHT: 64 IN | HEART RATE: 67 BPM | WEIGHT: 140 LBS | OXYGEN SATURATION: 100 % | RESPIRATION RATE: 18 BRPM | BODY MASS INDEX: 23.9 KG/M2 | TEMPERATURE: 98 F | DIASTOLIC BLOOD PRESSURE: 82 MMHG | SYSTOLIC BLOOD PRESSURE: 118 MMHG

## 2018-11-16 DIAGNOSIS — R11.0 NAUSEA: ICD-10-CM

## 2018-11-16 DIAGNOSIS — R05.9 COUGH: ICD-10-CM

## 2018-11-16 DIAGNOSIS — R09.82 PND (POST-NASAL DRIP): ICD-10-CM

## 2018-11-16 DIAGNOSIS — H66.91 RIGHT OTITIS MEDIA, UNSPECIFIED OTITIS MEDIA TYPE: Primary | ICD-10-CM

## 2018-11-16 PROCEDURE — 96372 THER/PROPH/DIAG INJ SC/IM: CPT | Mod: S$GLB,,, | Performed by: PHYSICIAN ASSISTANT

## 2018-11-16 PROCEDURE — 99214 OFFICE O/P EST MOD 30 MIN: CPT | Mod: 25,S$GLB,, | Performed by: PHYSICIAN ASSISTANT

## 2018-11-16 RX ORDER — BETAMETHASONE SODIUM PHOSPHATE AND BETAMETHASONE ACETATE 3; 3 MG/ML; MG/ML
6 INJECTION, SUSPENSION INTRA-ARTICULAR; INTRALESIONAL; INTRAMUSCULAR; SOFT TISSUE
Status: COMPLETED | OUTPATIENT
Start: 2018-11-16 | End: 2018-11-16

## 2018-11-16 RX ORDER — ONDANSETRON 4 MG/1
4 TABLET, ORALLY DISINTEGRATING ORAL EVERY 8 HOURS PRN
Qty: 20 TABLET | Refills: 0 | Status: SHIPPED | OUTPATIENT
Start: 2018-11-16

## 2018-11-16 RX ORDER — BENZONATATE 100 MG/1
200 CAPSULE ORAL 3 TIMES DAILY PRN
Qty: 60 CAPSULE | Refills: 0 | Status: SHIPPED | OUTPATIENT
Start: 2018-11-16

## 2018-11-16 RX ORDER — AZITHROMYCIN 250 MG/1
TABLET, FILM COATED ORAL
Qty: 6 TABLET | Refills: 0 | Status: SHIPPED | OUTPATIENT
Start: 2018-11-16

## 2018-11-16 RX ADMIN — BETAMETHASONE SODIUM PHOSPHATE AND BETAMETHASONE ACETATE 6 MG: 3; 3 INJECTION, SUSPENSION INTRA-ARTICULAR; INTRALESIONAL; INTRAMUSCULAR; SOFT TISSUE at 03:11

## 2018-11-16 NOTE — PROGRESS NOTES
"Subjective:       Patient ID: Mily Shore is a 33 y.o. female.    Vitals:  height is 5' 4" (1.626 m) and weight is 63.5 kg (140 lb). Her temperature is 98 °F (36.7 °C). Her blood pressure is 118/82 and her pulse is 67. Her respiration is 18 and oxygen saturation is 100%.     Chief Complaint: Sinus Problem    Sinus Problem   This is a new problem. The current episode started 1 to 4 weeks ago. The problem is unchanged. Her pain is at a severity of 6/10. The pain is moderate. Associated symptoms include coughing, ear pain, a hoarse voice and sinus pressure. Pertinent negatives include no chills, congestion, diaphoresis, shortness of breath or sore throat. Past treatments include oral decongestants. The treatment provided no relief.       Constitution: Negative for chills, sweating, fatigue and fever.   HENT: Positive for ear pain, postnasal drip, sinus pain, sinus pressure and voice change. Negative for congestion and sore throat.    Neck: Negative for painful lymph nodes.   Eyes: Negative for eye redness.   Respiratory: Positive for cough and sputum production. Negative for chest tightness, bloody sputum, COPD, shortness of breath, stridor, wheezing and asthma.    Gastrointestinal: Negative for nausea and vomiting.   Musculoskeletal: Negative for muscle ache.   Skin: Negative for rash.   Allergic/Immunologic: Negative for seasonal allergies and asthma.   Hematologic/Lymphatic: Negative for swollen lymph nodes.       Objective:      Physical Exam   Constitutional: She is oriented to person, place, and time. She appears well-developed and well-nourished. She is cooperative.  Non-toxic appearance. She does not appear ill. No distress.   HENT:   Head: Normocephalic and atraumatic.   Right Ear: Hearing, external ear and ear canal normal. Tympanic membrane is erythematous and bulging. A middle ear effusion is present.   Left Ear: Hearing, external ear and ear canal normal. A middle ear effusion is present. "   Nose: Rhinorrhea present. No mucosal edema or nasal deformity. No epistaxis. Right sinus exhibits no maxillary sinus tenderness and no frontal sinus tenderness. Left sinus exhibits no maxillary sinus tenderness and no frontal sinus tenderness.   Mouth/Throat: Uvula is midline, oropharynx is clear and moist and mucous membranes are normal. No trismus in the jaw. Normal dentition. No uvula swelling. No posterior oropharyngeal erythema.   cobbelstoning   Eyes: Conjunctivae and lids are normal. No scleral icterus.   Sclera clear bilat   Neck: Trachea normal, full passive range of motion without pain and phonation normal. Neck supple.   Cardiovascular: Normal rate, regular rhythm, normal heart sounds, intact distal pulses and normal pulses.   Pulmonary/Chest: Effort normal. No accessory muscle usage or stridor. No respiratory distress. She has no decreased breath sounds. She has no wheezes. She has no rhonchi. She has no rales.   Abdominal: Soft. Normal appearance and bowel sounds are normal. She exhibits no distension. There is no tenderness.   Musculoskeletal: Normal range of motion. She exhibits no edema or deformity.   Neurological: She is alert and oriented to person, place, and time. She exhibits normal muscle tone. Coordination normal.   Skin: Skin is warm, dry and intact. She is not diaphoretic. No pallor.   Psychiatric: She has a normal mood and affect. Her speech is normal and behavior is normal. Judgment and thought content normal. Cognition and memory are normal.   Nursing note and vitals reviewed.      Assessment:       1. Right otitis media, unspecified otitis media type    2. Cough    3. PND (post-nasal drip)    4. Nausea        Plan:         Right otitis media, unspecified otitis media type  -     azithromycin (ZITHROMAX Z-PAUL) 250 MG tablet; Take 2 tablets (500 mg) on  Day 1,  followed by 1 tablet (250 mg) once daily on Days 2 through 5.  Dispense: 6 tablet; Refill: 0  -     betamethasone  acetate-betamethasone sodium phosphate injection 6 mg    Cough  -     benzonatate (TESSALON PERLES) 100 MG capsule; Take 2 capsules (200 mg total) by mouth 3 (three) times daily as needed for Cough.  Dispense: 60 capsule; Refill: 0    PND (post-nasal drip)    Nausea  -     ondansetron (ZOFRAN-ODT) 4 MG TbDL; Take 1 tablet (4 mg total) by mouth every 8 (eight) hours as needed (nausea).  Dispense: 20 tablet; Refill: 0          Middle Ear Infection (Adult)  You have an infection of the middle ear, the space behind the eardrum. This is also called acute otitis media (AOM). Sometimes it is caused by the common cold. This is because congestion can block the internal passage (eustachian tube) that drains fluid from the middle ear. When the middle ear fills with fluid, bacteria can grow there and cause an infection. Oral antibiotics are used to treat this illness, not ear drops. Symptoms usually start to improve within 1 to 2 days of treatment.    Home care  The following are general care guidelines:  · Finish all of the antibiotic medicine given, even though you may feel better after the first few days.  · You may use over-the-counter medicine, such as acetaminophen or ibuprofen, to control pain and fever, unless something else was prescribed. If you have chronic liver or kidney disease or have ever had a stomach ulcer or gastrointestinal bleeding, talk with your healthcare provider before using these medicines. Do not give aspirin to anyone under 18 years of age who has a fever. It may cause severe illness or death.  Follow-up care  Follow up with your healthcare provider, or as advised, in 2 weeks if all symptoms have not gotten better, or if hearing doesn't go back to normal within 1 month.  When to seek medical advice  Call your healthcare provider right away if any of these occur:  · Ear pain gets worse or does not improve after 3 days of treatment  · Unusual drowsiness or confusion  · Neck pain, stiff neck, or  headache  · Fluid or blood draining from the ear canal  · Fever of 100.4°F (38°C) or as advised   · Seizure  Date Last Reviewed: 6/1/2016  © 0985-5930 [employer], [employer]. [address] [place] [address], [place] [address]. All rights reserved. This information is not intended as a substitute for professional medical care. Always follow your healthcare professional's instructions.      Please follow up with your Primary care provider within 2-5 days if your signs and symptoms have not resolved or worsen.     If your condition worsens or fails to improve we recommend that you receive another evaluation at the emergency room immediately or contact your primary medical clinic to discuss your concerns.   You must understand that you have received an Urgent Care treatment only and that you may be released before all of your medical problems are known or treated. You, the patient, will arrange for follow up care as instructed.     RED FLAGS/WARNING SYMPTOMS DISCUSSED WITH PATIENT THAT WOULD WARRANT EMERGENT MEDICAL ATTENTION. PATIENT VERBALIZED UNDERSTANDING.

## 2018-11-16 NOTE — PATIENT INSTRUCTIONS
Middle Ear Infection (Adult)  You have an infection of the middle ear, the space behind the eardrum. This is also called acute otitis media (AOM). Sometimes it is caused by the common cold. This is because congestion can block the internal passage (eustachian tube) that drains fluid from the middle ear. When the middle ear fills with fluid, bacteria can grow there and cause an infection. Oral antibiotics are used to treat this illness, not ear drops. Symptoms usually start to improve within 1 to 2 days of treatment.    Home care  The following are general care guidelines:  · Finish all of the antibiotic medicine given, even though you may feel better after the first few days.  · You may use over-the-counter medicine, such as acetaminophen or ibuprofen, to control pain and fever, unless something else was prescribed. If you have chronic liver or kidney disease or have ever had a stomach ulcer or gastrointestinal bleeding, talk with your healthcare provider before using these medicines. Do not give aspirin to anyone under 18 years of age who has a fever. It may cause severe illness or death.  Follow-up care  Follow up with your healthcare provider, or as advised, in 2 weeks if all symptoms have not gotten better, or if hearing doesn't go back to normal within 1 month.  When to seek medical advice  Call your healthcare provider right away if any of these occur:  · Ear pain gets worse or does not improve after 3 days of treatment  · Unusual drowsiness or confusion  · Neck pain, stiff neck, or headache  · Fluid or blood draining from the ear canal  · Fever of 100.4°F (38°C) or as advised   · Seizure  Date Last Reviewed: 6/1/2016  © 6144-2838 [employer]. [address] [place] [address], [place] [address]. All rights reserved. This information is not intended as a substitute for professional medical care. Always follow your healthcare professional's instructions.      Please follow up with your Primary care  provider within 2-5 days if your signs and symptoms have not resolved or worsen.     If your condition worsens or fails to improve we recommend that you receive another evaluation at the emergency room immediately or contact your primary medical clinic to discuss your concerns.   You must understand that you have received an Urgent Care treatment only and that you may be released before all of your medical problems are known or treated. You, the patient, will arrange for follow up care as instructed.     RED FLAGS/WARNING SYMPTOMS DISCUSSED WITH PATIENT THAT WOULD WARRANT EMERGENT MEDICAL ATTENTION. PATIENT VERBALIZED UNDERSTANDING.

## 2018-11-28 ENCOUNTER — OFFICE VISIT (OUTPATIENT)
Dept: URGENT CARE | Facility: CLINIC | Age: 33
End: 2018-11-28
Payer: MEDICARE

## 2018-11-28 VITALS
WEIGHT: 140 LBS | BODY MASS INDEX: 23.9 KG/M2 | SYSTOLIC BLOOD PRESSURE: 114 MMHG | RESPIRATION RATE: 18 BRPM | HEART RATE: 75 BPM | OXYGEN SATURATION: 100 % | DIASTOLIC BLOOD PRESSURE: 70 MMHG | TEMPERATURE: 98 F | HEIGHT: 64 IN

## 2018-11-28 DIAGNOSIS — J06.9 UPPER RESPIRATORY TRACT INFECTION, UNSPECIFIED TYPE: Primary | ICD-10-CM

## 2018-11-28 DIAGNOSIS — H92.03 OTALGIA OF BOTH EARS: ICD-10-CM

## 2018-11-28 DIAGNOSIS — R05.9 COUGH: ICD-10-CM

## 2018-11-28 PROCEDURE — 99214 OFFICE O/P EST MOD 30 MIN: CPT | Mod: S$GLB,,, | Performed by: PHYSICIAN ASSISTANT

## 2018-11-28 PROCEDURE — 71046 X-RAY EXAM CHEST 2 VIEWS: CPT | Mod: FY,S$GLB,, | Performed by: RADIOLOGY

## 2018-11-28 RX ORDER — CODEINE PHOSPHATE AND GUAIFENESIN 10; 100 MG/5ML; MG/5ML
5 SOLUTION ORAL 3 TIMES DAILY PRN
Qty: 120 ML | Refills: 0 | Status: SHIPPED | OUTPATIENT
Start: 2018-11-28 | End: 2018-12-08

## 2018-11-28 NOTE — PROGRESS NOTES
"Subjective:       Patient ID: Mily Shore is a 33 y.o. female.    Vitals:  height is 5' 4" (1.626 m) and weight is 63.5 kg (140 lb). Her temperature is 98.3 °F (36.8 °C). Her blood pressure is 114/70 and her pulse is 75. Her respiration is 18 and oxygen saturation is 100%.     Chief Complaint: Cough    Cough   This is a new problem. The current episode started more than 1 month ago (1 month). The problem has been gradually worsening. The problem occurs constantly. The cough is productive of sputum. Associated symptoms include ear pain and a sore throat. Pertinent negatives include no chills, eye redness, fever, hemoptysis, myalgias, rash, shortness of breath or wheezing. Nothing aggravates the symptoms. She has tried OTC cough suppressant for the symptoms. The treatment provided mild relief. Her past medical history is significant for bronchitis. There is no history of asthma or pneumonia.       Constitution: Negative for chills, sweating, fatigue and fever.   HENT: Positive for ear pain, sinus pain, sinus pressure and sore throat. Negative for congestion and voice change.    Neck: Negative for painful lymph nodes.   Eyes: Negative for eye redness.   Respiratory: Positive for cough and sputum production. Negative for chest tightness, bloody sputum, COPD, shortness of breath, stridor, wheezing and asthma.    Gastrointestinal: Negative for nausea and vomiting.   Musculoskeletal: Negative for muscle ache.   Skin: Negative for rash.   Allergic/Immunologic: Negative for seasonal allergies and asthma.   Hematologic/Lymphatic: Negative for swollen lymph nodes.       Objective:      Physical Exam   Constitutional: She is oriented to person, place, and time. She appears well-developed and well-nourished. She is cooperative.  Non-toxic appearance. She does not appear ill. No distress.   HENT:   Head: Normocephalic and atraumatic.   Right Ear: Hearing, tympanic membrane, external ear and ear canal normal.   Left " Ear: Hearing, tympanic membrane, external ear and ear canal normal.   Nose: Rhinorrhea present. No mucosal edema or nasal deformity. No epistaxis. Right sinus exhibits no maxillary sinus tenderness and no frontal sinus tenderness. Left sinus exhibits no maxillary sinus tenderness and no frontal sinus tenderness.   Mouth/Throat: Uvula is midline, oropharynx is clear and moist and mucous membranes are normal. No trismus in the jaw. Normal dentition. No uvula swelling. No posterior oropharyngeal erythema.   Eyes: Conjunctivae and lids are normal. No scleral icterus.   Sclera clear bilat   Neck: Trachea normal, full passive range of motion without pain and phonation normal. Neck supple.   Cardiovascular: Normal rate, regular rhythm, normal heart sounds, intact distal pulses and normal pulses.   Pulmonary/Chest: Effort normal. No accessory muscle usage or stridor. No respiratory distress. She has no decreased breath sounds. She has no wheezes. She has no rhonchi. She has no rales.   Intermittent nonproductive cough noted   Abdominal: Soft. Normal appearance and bowel sounds are normal. She exhibits no distension. There is no tenderness.   Musculoskeletal: Normal range of motion. She exhibits no edema or deformity.   Neurological: She is alert and oriented to person, place, and time. She exhibits normal muscle tone. Coordination normal.   Skin: Skin is warm, dry and intact. She is not diaphoretic. No pallor.   Psychiatric: She has a normal mood and affect. Her speech is normal and behavior is normal. Judgment and thought content normal. Cognition and memory are normal.   Nursing note and vitals reviewed.        XRAY: No acute cardiopulmonary processes    Assessment:       1. Upper respiratory tract infection, unspecified type    2. Cough    3. Otalgia of both ears        Plan:         Upper respiratory tract infection, unspecified type    Cough  -     X-Ray Chest PA And Lateral; Future; Expected date: 11/28/2018  -      guaifenesin-codeine 100-10 mg/5 ml (TUSSI-ORGANIDIN NR)  mg/5 mL syrup; Take 5 mLs by mouth 3 (three) times daily as needed for Cough.  Dispense: 120 mL; Refill: 0    Otalgia of both ears        Please follow up with your Primary care provider within 2-5 days if your signs and symptoms have not resolved or worsen.     If your condition worsens or fails to improve we recommend that you receive another evaluation at the emergency room immediately or contact your primary medical clinic to discuss your concerns.   You must understand that you have received an Urgent Care treatment only and that you may be released before all of your medical problems are known or treated. You, the patient, will arrange for follow up care as instructed.     RED FLAGS/WARNING SYMPTOMS DISCUSSED WITH PATIENT THAT WOULD WARRANT EMERGENT MEDICAL ATTENTION. PATIENT VERBALIZED UNDERSTANDING.

## 2021-07-02 NOTE — TELEPHONE ENCOUNTER
Patient notified that we need a referral in order to schedule an appointment.  Fax number given.  Instructed to have medical records for her first visit.   Quality 110: Preventive Care And Screening: Influenza Immunization: Influenza Immunization Administered during Influenza season Quality 431: Preventive Care And Screening: Unhealthy Alcohol Use - Screening: Patient screened for unhealthy alcohol use using a single question and scores less than 2 times per year Quality 130: Documentation Of Current Medications In The Medical Record: Current Medications Documented Detail Level: Detailed Quality 226: Preventive Care And Screening: Tobacco Use: Screening And Cessation Intervention: Patient screened for tobacco use and is an ex/non-smoker

## 2024-08-29 NOTE — PATIENT INSTRUCTIONS
"- Please download "Migraine Flash" jessica on phone and begin tracking your headaches     - Will seek approval for Botox    - For migraine prevention - Amitriptyline 25 mg nightly   If after 2 weeks, no benefit, but no side effects increase to 2 tabs nightly     - To break up your headache cycle - Prednisone taper   3 tabs x 5 days, then 2 tabs x 5 days, then 1 tab x 5 days   Take in the morning with food     Supplements to Consider:  Vitamin B2 400 mg daily   Co-Q10 200 mg daily     Phone # to Dr. Tillman - [phone]      "
[Negative] : Constitutional